# Patient Record
Sex: MALE | Race: WHITE | NOT HISPANIC OR LATINO | URBAN - METROPOLITAN AREA
[De-identification: names, ages, dates, MRNs, and addresses within clinical notes are randomized per-mention and may not be internally consistent; named-entity substitution may affect disease eponyms.]

---

## 2017-05-10 ENCOUNTER — OUTPATIENT (OUTPATIENT)
Dept: OUTPATIENT SERVICES | Facility: HOSPITAL | Age: 67
LOS: 1 days | End: 2017-05-10
Payer: MEDICARE

## 2017-05-10 DIAGNOSIS — M47.817 SPONDYLOSIS WITHOUT MYELOPATHY OR RADICULOPATHY, LUMBOSACRAL REGION: ICD-10-CM

## 2017-05-10 PROCEDURE — 76000 FLUOROSCOPY <1 HR PHYS/QHP: CPT

## 2017-05-10 PROCEDURE — 64495 INJ PARAVERT F JNT L/S 3 LEV: CPT | Mod: 50

## 2017-05-10 PROCEDURE — 64494 INJ PARAVERT F JNT L/S 2 LEV: CPT | Mod: 50

## 2017-05-10 PROCEDURE — 64493 INJ PARAVERT F JNT L/S 1 LEV: CPT | Mod: 50

## 2017-05-11 ENCOUNTER — TRANSCRIPTION ENCOUNTER (OUTPATIENT)
Age: 67
End: 2017-05-11

## 2017-05-17 ENCOUNTER — OUTPATIENT (OUTPATIENT)
Dept: OUTPATIENT SERVICES | Facility: HOSPITAL | Age: 67
LOS: 1 days | End: 2017-05-17
Payer: MEDICARE

## 2017-05-17 DIAGNOSIS — M47.812 SPONDYLOSIS WITHOUT MYELOPATHY OR RADICULOPATHY, CERVICAL REGION: ICD-10-CM

## 2017-05-17 PROCEDURE — 64492 INJ PARAVERT F JNT C/T 3 LEV: CPT | Mod: 50

## 2017-05-17 PROCEDURE — 76000 FLUOROSCOPY <1 HR PHYS/QHP: CPT

## 2017-05-17 PROCEDURE — 64490 INJ PARAVERT F JNT C/T 1 LEV: CPT | Mod: 50

## 2017-05-17 PROCEDURE — 64491 INJ PARAVERT F JNT C/T 2 LEV: CPT | Mod: 50

## 2017-05-18 ENCOUNTER — TRANSCRIPTION ENCOUNTER (OUTPATIENT)
Age: 67
End: 2017-05-18

## 2019-06-12 ENCOUNTER — EMERGENCY (EMERGENCY)
Facility: HOSPITAL | Age: 69
LOS: 1 days | Discharge: OTHER INSTITUTION | End: 2019-06-12
Attending: EMERGENCY MEDICINE
Payer: MEDICARE

## 2019-06-12 VITALS
OXYGEN SATURATION: 93 % | HEART RATE: 83 BPM | RESPIRATION RATE: 20 BRPM | WEIGHT: 259.93 LBS | HEIGHT: 68 IN | TEMPERATURE: 97 F | DIASTOLIC BLOOD PRESSURE: 81 MMHG | SYSTOLIC BLOOD PRESSURE: 145 MMHG

## 2019-06-12 DIAGNOSIS — F31.4 BIPOLAR DISORDER, CURRENT EPISODE DEPRESSED, SEVERE, WITHOUT PSYCHOTIC FEATURES: ICD-10-CM

## 2019-06-12 LAB
ALBUMIN SERPL ELPH-MCNC: 4.4 G/DL — SIGNIFICANT CHANGE UP (ref 3.3–5.2)
ALP SERPL-CCNC: 110 U/L — SIGNIFICANT CHANGE UP (ref 40–120)
ALT FLD-CCNC: 35 U/L — SIGNIFICANT CHANGE UP
AMPHET UR-MCNC: NEGATIVE — SIGNIFICANT CHANGE UP
ANION GAP SERPL CALC-SCNC: 13 MMOL/L — SIGNIFICANT CHANGE UP (ref 5–17)
APAP SERPL-MCNC: <7.5 UG/ML — LOW (ref 10–26)
APPEARANCE UR: CLEAR — SIGNIFICANT CHANGE UP
AST SERPL-CCNC: 24 U/L — SIGNIFICANT CHANGE UP
BARBITURATES UR SCN-MCNC: NEGATIVE — SIGNIFICANT CHANGE UP
BASOPHILS # BLD AUTO: 0 K/UL — SIGNIFICANT CHANGE UP (ref 0–0.2)
BASOPHILS NFR BLD AUTO: 0.1 % — SIGNIFICANT CHANGE UP (ref 0–2)
BENZODIAZ UR-MCNC: POSITIVE
BILIRUB SERPL-MCNC: 0.6 MG/DL — SIGNIFICANT CHANGE UP (ref 0.4–2)
BILIRUB UR-MCNC: NEGATIVE — SIGNIFICANT CHANGE UP
BUN SERPL-MCNC: 16 MG/DL — SIGNIFICANT CHANGE UP (ref 8–20)
CALCIUM SERPL-MCNC: 9.8 MG/DL — SIGNIFICANT CHANGE UP (ref 8.6–10.2)
CHLORIDE SERPL-SCNC: 101 MMOL/L — SIGNIFICANT CHANGE UP (ref 98–107)
CO2 SERPL-SCNC: 25 MMOL/L — SIGNIFICANT CHANGE UP (ref 22–29)
COCAINE METAB.OTHER UR-MCNC: NEGATIVE — SIGNIFICANT CHANGE UP
COLOR SPEC: YELLOW — SIGNIFICANT CHANGE UP
CREAT SERPL-MCNC: 0.92 MG/DL — SIGNIFICANT CHANGE UP (ref 0.5–1.3)
DIFF PNL FLD: NEGATIVE — SIGNIFICANT CHANGE UP
EOSINOPHIL # BLD AUTO: 0.1 K/UL — SIGNIFICANT CHANGE UP (ref 0–0.5)
EOSINOPHIL NFR BLD AUTO: 1.5 % — SIGNIFICANT CHANGE UP (ref 0–5)
EPI CELLS # UR: SIGNIFICANT CHANGE UP
ETHANOL SERPL-MCNC: <10 MG/DL — SIGNIFICANT CHANGE UP
GLUCOSE SERPL-MCNC: 238 MG/DL — HIGH (ref 70–115)
GLUCOSE UR QL: 100 MG/DL
HCT VFR BLD CALC: 44.6 % — SIGNIFICANT CHANGE UP (ref 42–52)
HGB BLD-MCNC: 15.4 G/DL — SIGNIFICANT CHANGE UP (ref 14–18)
KETONES UR-MCNC: NEGATIVE — SIGNIFICANT CHANGE UP
LEUKOCYTE ESTERASE UR-ACNC: NEGATIVE — SIGNIFICANT CHANGE UP
LYMPHOCYTES # BLD AUTO: 1.9 K/UL — SIGNIFICANT CHANGE UP (ref 1–4.8)
LYMPHOCYTES # BLD AUTO: 24.1 % — SIGNIFICANT CHANGE UP (ref 20–55)
MAGNESIUM SERPL-MCNC: 1.9 MG/DL — SIGNIFICANT CHANGE UP (ref 1.8–2.6)
MCHC RBC-ENTMCNC: 31 PG — SIGNIFICANT CHANGE UP (ref 27–31)
MCHC RBC-ENTMCNC: 34.5 G/DL — SIGNIFICANT CHANGE UP (ref 32–36)
MCV RBC AUTO: 89.9 FL — SIGNIFICANT CHANGE UP (ref 80–94)
METHADONE UR-MCNC: NEGATIVE — SIGNIFICANT CHANGE UP
MONOCYTES # BLD AUTO: 0.5 K/UL — SIGNIFICANT CHANGE UP (ref 0–0.8)
MONOCYTES NFR BLD AUTO: 6.8 % — SIGNIFICANT CHANGE UP (ref 3–10)
NEUTROPHILS # BLD AUTO: 5.4 K/UL — SIGNIFICANT CHANGE UP (ref 1.8–8)
NEUTROPHILS NFR BLD AUTO: 67.2 % — SIGNIFICANT CHANGE UP (ref 37–73)
NITRITE UR-MCNC: NEGATIVE — SIGNIFICANT CHANGE UP
OPIATES UR-MCNC: NEGATIVE — SIGNIFICANT CHANGE UP
PCP SPEC-MCNC: SIGNIFICANT CHANGE UP
PCP UR-MCNC: NEGATIVE — SIGNIFICANT CHANGE UP
PH UR: 6 — SIGNIFICANT CHANGE UP (ref 5–8)
PLATELET # BLD AUTO: 154 K/UL — SIGNIFICANT CHANGE UP (ref 150–400)
POTASSIUM SERPL-MCNC: 4.4 MMOL/L — SIGNIFICANT CHANGE UP (ref 3.5–5.3)
POTASSIUM SERPL-SCNC: 4.4 MMOL/L — SIGNIFICANT CHANGE UP (ref 3.5–5.3)
PROT SERPL-MCNC: 8.2 G/DL — SIGNIFICANT CHANGE UP (ref 6.6–8.7)
PROT UR-MCNC: 15 MG/DL
RBC # BLD: 4.96 M/UL — SIGNIFICANT CHANGE UP (ref 4.6–6.2)
RBC # FLD: 13.1 % — SIGNIFICANT CHANGE UP (ref 11–15.6)
RBC CASTS # UR COMP ASSIST: NEGATIVE /HPF — SIGNIFICANT CHANGE UP (ref 0–4)
SALICYLATES SERPL-MCNC: <0.6 MG/DL — LOW (ref 10–20)
SODIUM SERPL-SCNC: 139 MMOL/L — SIGNIFICANT CHANGE UP (ref 135–145)
SP GR SPEC: 1.01 — SIGNIFICANT CHANGE UP (ref 1.01–1.02)
THC UR QL: NEGATIVE — SIGNIFICANT CHANGE UP
TSH SERPL-MCNC: 1 UIU/ML — SIGNIFICANT CHANGE UP (ref 0.27–4.2)
UROBILINOGEN FLD QL: NEGATIVE MG/DL — SIGNIFICANT CHANGE UP
WBC # BLD: 8 K/UL — SIGNIFICANT CHANGE UP (ref 4.8–10.8)
WBC # FLD AUTO: 8 K/UL — SIGNIFICANT CHANGE UP (ref 4.8–10.8)
WBC UR QL: SIGNIFICANT CHANGE UP

## 2019-06-12 PROCEDURE — 93010 ELECTROCARDIOGRAM REPORT: CPT

## 2019-06-12 PROCEDURE — 99285 EMERGENCY DEPT VISIT HI MDM: CPT

## 2019-06-12 PROCEDURE — 90792 PSYCH DIAG EVAL W/MED SRVCS: CPT

## 2019-06-12 RX ORDER — HYDROXYZINE HCL 10 MG
50 TABLET ORAL THREE TIMES A DAY
Refills: 0 | Status: DISCONTINUED | OUTPATIENT
Start: 2019-06-12 | End: 2019-06-17

## 2019-06-12 RX ORDER — LURASIDONE HYDROCHLORIDE 40 MG/1
60 TABLET ORAL AT BEDTIME
Refills: 0 | Status: DISCONTINUED | OUTPATIENT
Start: 2019-06-12 | End: 2019-06-17

## 2019-06-12 RX ORDER — ATORVASTATIN CALCIUM 80 MG/1
20 TABLET, FILM COATED ORAL DAILY
Refills: 0 | Status: DISCONTINUED | OUTPATIENT
Start: 2019-06-12 | End: 2019-06-17

## 2019-06-12 RX ORDER — ALPRAZOLAM 0.25 MG
1 TABLET ORAL THREE TIMES A DAY
Refills: 0 | Status: COMPLETED | OUTPATIENT
Start: 2019-06-12 | End: 2019-06-19

## 2019-06-12 RX ORDER — LEVOTHYROXINE SODIUM 125 MCG
112 TABLET ORAL DAILY
Refills: 0 | Status: DISCONTINUED | OUTPATIENT
Start: 2019-06-12 | End: 2019-06-17

## 2019-06-12 RX ORDER — TRAZODONE HCL 50 MG
50 TABLET ORAL AT BEDTIME
Refills: 0 | Status: DISCONTINUED | OUTPATIENT
Start: 2019-06-12 | End: 2019-06-17

## 2019-06-12 RX ADMIN — ATORVASTATIN CALCIUM 20 MILLIGRAM(S): 80 TABLET, FILM COATED ORAL at 22:56

## 2019-06-12 RX ADMIN — Medication 2 MILLIGRAM(S): at 15:27

## 2019-06-12 RX ADMIN — LURASIDONE HYDROCHLORIDE 60 MILLIGRAM(S): 40 TABLET ORAL at 22:57

## 2019-06-12 RX ADMIN — Medication 50 MILLIGRAM(S): at 22:56

## 2019-06-12 RX ADMIN — Medication 50 MILLIGRAM(S): at 22:57

## 2019-06-12 RX ADMIN — Medication 1 MILLIGRAM(S): at 22:57

## 2019-06-12 NOTE — ED BEHAVIORAL HEALTH NOTE - BEHAVIORAL HEALTH NOTE
SW Note: Pt was seen by Dr. Wray and plan is to transfer pt for inpt psychiatric tx. The pt will be on a voluntary status and only wants placement at [facility]. Called [facility] [phone]. No appropriate male beds at this time.  provider and staff aware. pt will be held overnight to explore placement at [facility] tomorrow

## 2019-06-12 NOTE — ED STATDOCS - CLINICAL SUMMARY MEDICAL DECISION MAKING FREE TEXT BOX
Psych evaluation for mood fluctuance and SI. Psych evaluation for mood fluctuance and SI. will check labs, ekg

## 2019-06-12 NOTE — ED BEHAVIORAL HEALTH ASSESSMENT NOTE - RISK ASSESSMENT
High: Patient reports high psychic anxiety, global insomnia, failed outpatient tx, hopeless, recent suicidal ideation, with inability to contract for safety, he does want help, has good support, and is engaged in treatment.  He denies any substance use.

## 2019-06-12 NOTE — ED BEHAVIORAL HEALTH ASSESSMENT NOTE - DESCRIPTION
Patient's leg was shaking, he reports feeling "jittery".  States he feels less anxious at the moment. Denies any physical complaints.      Vital Signs Last 24 Hrs  T(C): 36.6 (12 Jun 2019 12:43), Max: 36.6 (12 Jun 2019 12:43)  T(F): 97.8 (12 Jun 2019 12:43), Max: 97.8 (12 Jun 2019 12:43)  HR: 89 (12 Jun 2019 12:43) (83 - 89)  BP: 145/79 (12 Jun 2019 12:43) (145/79 - 145/81)  BP(mean): --  RR: 18 (12 Jun 2019 12:43) (18 - 20)  SpO2: 96% (12 Jun 2019 12:43) (93% - 96%) DM, HLD, hypothyroidism Patient was raised by parents.  He was one of 4 children.  Father was shot and killed when he was 55.  Currently  with one duaghter

## 2019-06-12 NOTE — ED ADULT NURSE NOTE - OBJECTIVE STATEMENT
Patient presented in  dressed in casual attire.  Patient alert and oriented times four.  Patient reports he has had worsening anxiety and not slept in 4 days.  Patient reports Dr. Live his psychiatrist has been trying to manage his BAD on an outpatient basis but he feels he needs to be hospitalized.  Patient describes being manic over to winter holidays and then depressed and now extremely anxious.  Denies any psychotic symptoms.  Patient denies suicidal or homicidal ideations.  Patients last hospitalization was 20 years ago at [facility].

## 2019-06-12 NOTE — ED BEHAVIORAL HEALTH ASSESSMENT NOTE - CURRENT MEDICATION
Xanax 1 mg 3 x daily, Latuda 80 mg at night, Hydroxyzine 50 mg 3 x daily,  [place] thyroid 90 mg daily, Atorvastatin 20 mg daily, Novolog Flexpen, Lantus Solostar

## 2019-06-12 NOTE — ED BEHAVIORAL HEALTH ASSESSMENT NOTE - SUMMARY
Patient is a 69  year old, male; domiciled with wife of many years ; with one adult daughter,  retired , with long h/o Bipolar disorder; with multiple prior psychiatric  hospitalizations (last one was over 20 years ago) ; no known suicide attempts; no known history of violence or arrests; no active substance abuse or known history of complicated withdrawal; with PMhx of DM, HLD who walked in to ER accompanied by wife referred by psychiatrist  for evaluation of intolerable and anxiety, suicidal ideation and requesting inpatient psychiatric hospitalization.  Patient reports that he was stable for the last 20 years and had first manic episode in over 20 years over holidays.  He psychiatrist adjusted medications (added Latuda, discontinued Lamictal) and patient reports that around march his mood switched from "high" to "low".  Over the last month he has been increasingly anxious.  He denies any specific stressors, and over the last few days it has been intolerable and lasting for hours at time with global insomnia, anhedonia, decreased energy and concentration. Last night patient developed suicidal ideation for first time in his life and was afraid that he would act on these thoughts. He reports medications are no longer working and he is unable to function at home.  He saw psychiatrist on 6/10 who referred him for inpatient hospitalization. Patient is considered high risk and is requesting voluntary inpatient hospitalization

## 2019-06-12 NOTE — ED BEHAVIORAL HEALTH ASSESSMENT NOTE - HPI (INCLUDE ILLNESS QUALITY, SEVERITY, DURATION, TIMING, CONTEXT, MODIFYING FACTORS, ASSOCIATED SIGNS AND SYMPTOMS)
Patient is a 69  year old, male; domiciled with wife of many years ; with one adult daughter,  retired , with long h/o Bipolar disorder; with multiple prior psychiatric  hospitalizations (last one was over 20 years ago) ; no known suicide attempts; no known history of violence or arrests; no active substance abuse or known history of complicated withdrawal; with PMhx of DM, HLD who walked in to ER accompanied by wife referred by psychiatrist  for evaluation of intolerable and anxiety, suicidal ideation and requesting inpatient psychiatric hospitalization.       Patient reports that he has been stable for over 20 years until the holidays when he had a manic episodes during which he reports several weeks of increased energy, decreased need for sleep, more impulsive behavior, increase in cooking, following supplements and organic diets.  He had been taking medication consistently. He followed up with his psychiatrist Dr. Live who adjusted the medications and he fell into a "low episode" starting march.        Patient reports that for the last month he has been increasingly anxious and medication adjustments by psychiatrist do not work.  He states he was started on Hydroxyzine, latuda was increased and discontinued and even taking extra Xanax have not been effective.  For the last few days his anxiety has been getting worse. He reports he hardly slept for the last three days, and last night was the worse in hi life.  He reports his anxiety has been "extreme".  He denies any stressors and denies any cognitive thoughts associated with anxiety.  Last night he had suicidal thoughts for the first time.  He told his wife he "can't take it any longer".  Although he denied having any intent and plan.  He reports he wants help but was afraid that he would do something to end his life. He reiterated that this has never happened before.    Patient reports that he does not feel safe outside the hospital and is afraid of continued anxiety. He did see psychiatrist on 6/10 who recommended admission to the hospital for further help.        As per progress note from psychiatrist on 6/10 patient was described as "depression with anxiety tension, fearful of future, insomnia, cannot relax, feels hopeless, cannot function at home.  He has been using high doses of benzodiazepines without any benefit" and he referred patient for "inpatient hospitalization".    Concerning other psychiatric symptoms reports depressed mood, but feels that anxiety is primary, he reports difficulty the last month, with low energy the last week, inability to derive pleasure from activities. Pt denies any current symptoms of  happiness, unusual energy, unusual nightime excitation or other common symptoms of ollie. Pt denies hearing voices or seeing things.  No delusions were elicited.  Patient does report prior panic attacks.  He feels he needs to be in the hospital and wants to voluntarily sign in.

## 2019-06-12 NOTE — ED BEHAVIORAL HEALTH ASSESSMENT NOTE - OTHER PAST PSYCHIATRIC HISTORY (INCLUDE DETAILS REGARDING ONSET, COURSE OF ILLNESS, INPATIENT/OUTPATIENT TREATMENT)
Patient has long psychiatric history. Patient was first admitted to hospital in 1967 while in hospital for first severe depressive episode.  Patient reports he was treated with ECT.  He reports he has been hospitalized a total of around 5 times and last time was 1988.  Most of other episodes were related to ollie. He denies any h/o prior suicide attempts. Currently he is followed by Dr. Live.

## 2019-06-12 NOTE — ED BEHAVIORAL HEALTH ASSESSMENT NOTE - DETAILS
NA see HPI appreciated most recent progress note looking for inpatient beds appreciated most recent progress note. Called psychiatrist and left message with office ([phone])

## 2019-06-12 NOTE — ED BEHAVIORAL HEALTH ASSESSMENT NOTE - OTHER
most recent progress note from psychiatrist (6/10) billing in PK psychiatrist looking for inpatient beds

## 2019-06-12 NOTE — ED ADULT NURSE NOTE - CHIEF COMPLAINT QUOTE
bib wife, ryan since april 80 mg,  hydroxyzine 50 tid, pt states bipolar, c/o pain and headache, and SI if "pain won't go away " behavioural change, pt appears anxious and very restless

## 2019-06-12 NOTE — ED STATDOCS - OBJECTIVE STATEMENT
69 y/o M pt with PMHx of bipolar, DM, HLD, presents to the ED c/o psychiatric evaluation. Patient states that he has not had a manic episode in 20 years. Around [place] time, his episodes began again, but had worsened. Last night states he could not rid of the pain in his head after taking his xanax. States he has a brief time last night where he had SI. Believes his xanax is not working. He has not been able to sleep properly due to the anxiety. Denies CP, SOB, HI.

## 2019-06-13 VITALS
SYSTOLIC BLOOD PRESSURE: 136 MMHG | TEMPERATURE: 98 F | HEART RATE: 88 BPM | DIASTOLIC BLOOD PRESSURE: 82 MMHG | RESPIRATION RATE: 18 BRPM | OXYGEN SATURATION: 95 %

## 2019-06-13 PROCEDURE — 36415 COLL VENOUS BLD VENIPUNCTURE: CPT

## 2019-06-13 PROCEDURE — 99285 EMERGENCY DEPT VISIT HI MDM: CPT | Mod: 25

## 2019-06-13 PROCEDURE — 81001 URINALYSIS AUTO W/SCOPE: CPT

## 2019-06-13 PROCEDURE — 84443 ASSAY THYROID STIM HORMONE: CPT

## 2019-06-13 PROCEDURE — 85027 COMPLETE CBC AUTOMATED: CPT

## 2019-06-13 PROCEDURE — 80053 COMPREHEN METABOLIC PANEL: CPT

## 2019-06-13 PROCEDURE — 93005 ELECTROCARDIOGRAM TRACING: CPT

## 2019-06-13 PROCEDURE — 96372 THER/PROPH/DIAG INJ SC/IM: CPT

## 2019-06-13 PROCEDURE — 83735 ASSAY OF MAGNESIUM: CPT

## 2019-06-13 PROCEDURE — 82962 GLUCOSE BLOOD TEST: CPT

## 2019-06-13 PROCEDURE — 80307 DRUG TEST PRSMV CHEM ANLYZR: CPT

## 2019-06-13 RX ADMIN — Medication 112 MICROGRAM(S): at 06:58

## 2019-06-13 RX ADMIN — Medication 50 MILLIGRAM(S): at 06:58

## 2019-06-13 RX ADMIN — Medication 1 MILLIGRAM(S): at 06:58

## 2019-06-13 RX ADMIN — Medication 2 MILLIGRAM(S): at 01:08

## 2019-06-13 NOTE — ED BEHAVIORAL HEALTH NOTE - BEHAVIORAL HEALTH NOTE
SW Note: pt accepted to [facility] and transferred today for inpt psychiatric care. Accepting Dr. Adal Yee. 9.13 legals completed with pt. [facility]. Pts primary policy is medicare but has 2 other policies 1. B/C ID [id number] and 2. B/C ID [id number]. Confirmed with [facility] RAYMOND Gracia that his second policy only needs a precert. SW to follow

## 2019-06-13 NOTE — ED ADULT NURSE REASSESSMENT NOTE - NS ED NURSE REASSESS COMMENT FT1
Patient ate 100% of his breakfast and then returned to bed.  Safety of patient maintained.
Patient requested and received medication intervention for increasing anxiety.  Patient received Ativan 2mg IM in right gluteal muscle at 1527 with results pending.  Safety of patient maintained.
Assumed care of pt @1930. Pt c/o of not being able to sleep for the past x3 nights. Pt anxious at times, requesting medication for anxiety and insomnia. Pt currently resting/sleeping comfortably, in no apparent distress. No harm to self or others. Safety maintained. Will continue to monitor the pt for safety.
Assumed care of patient at 0720.  Patient resting in bed awake with no distress noted.  Patient oriented to staff and educated about plan of care which he verbalized understanding of.  Safety of patient maintained.
Patient adjusting well to  area.  Patient sitting at table eating lunch.  No attempts to harm self or others and safety maintained.

## 2019-06-13 NOTE — ED ADULT NURSE REASSESSMENT NOTE - COMFORT CARE
warm blanket provided/wait time explained/ambulated to bathroom/darkened lights/repositioned/plan of care explained
plan of care explained
plan of care explained

## 2019-06-13 NOTE — ED ADULT NURSE REASSESSMENT NOTE - GENERAL PATIENT STATE
resting/sleeping/comfortable appearance/anxious/cooperative
cooperative/comfortable appearance
resting/sleeping/comfortable appearance/cooperative

## 2019-07-26 ENCOUNTER — INPATIENT (INPATIENT)
Facility: HOSPITAL | Age: 69
LOS: 34 days | Discharge: HOME | End: 2019-08-30
Attending: PSYCHIATRY & NEUROLOGY | Admitting: PSYCHIATRY & NEUROLOGY
Payer: MEDICARE

## 2019-07-26 VITALS
HEART RATE: 70 BPM | SYSTOLIC BLOOD PRESSURE: 125 MMHG | TEMPERATURE: 98 F | RESPIRATION RATE: 16 BRPM | OXYGEN SATURATION: 100 % | DIASTOLIC BLOOD PRESSURE: 72 MMHG

## 2019-07-26 DIAGNOSIS — F31.30 BIPOLAR DISORDER, CURRENT EPISODE DEPRESSED, MILD OR MODERATE SEVERITY, UNSPECIFIED: ICD-10-CM

## 2019-07-26 LAB
ALBUMIN SERPL ELPH-MCNC: 4.2 G/DL — SIGNIFICANT CHANGE UP (ref 3.3–5)
ALP SERPL-CCNC: 112 U/L — SIGNIFICANT CHANGE UP (ref 40–120)
ALT FLD-CCNC: 47 U/L — HIGH (ref 4–41)
AMPHET UR-MCNC: NEGATIVE — SIGNIFICANT CHANGE UP
ANION GAP SERPL CALC-SCNC: 10 MMO/L — SIGNIFICANT CHANGE UP (ref 7–14)
APAP SERPL-MCNC: < 15 UG/ML — LOW (ref 15–25)
APPEARANCE UR: CLEAR — SIGNIFICANT CHANGE UP
AST SERPL-CCNC: 20 U/L — SIGNIFICANT CHANGE UP (ref 4–40)
BARBITURATES UR SCN-MCNC: NEGATIVE — SIGNIFICANT CHANGE UP
BASOPHILS # BLD AUTO: 0.04 K/UL — SIGNIFICANT CHANGE UP (ref 0–0.2)
BASOPHILS NFR BLD AUTO: 0.4 % — SIGNIFICANT CHANGE UP (ref 0–2)
BENZODIAZ UR-MCNC: NEGATIVE — SIGNIFICANT CHANGE UP
BILIRUB SERPL-MCNC: 0.5 MG/DL — SIGNIFICANT CHANGE UP (ref 0.2–1.2)
BILIRUB UR-MCNC: NEGATIVE — SIGNIFICANT CHANGE UP
BLOOD UR QL VISUAL: NEGATIVE — SIGNIFICANT CHANGE UP
BUN SERPL-MCNC: 16 MG/DL — SIGNIFICANT CHANGE UP (ref 7–23)
CALCIUM SERPL-MCNC: 9.5 MG/DL — SIGNIFICANT CHANGE UP (ref 8.4–10.5)
CANNABINOIDS UR-MCNC: NEGATIVE — SIGNIFICANT CHANGE UP
CHLORIDE SERPL-SCNC: 106 MMOL/L — SIGNIFICANT CHANGE UP (ref 98–107)
CO2 SERPL-SCNC: 23 MMOL/L — SIGNIFICANT CHANGE UP (ref 22–31)
COCAINE METAB.OTHER UR-MCNC: NEGATIVE — SIGNIFICANT CHANGE UP
COLOR SPEC: SIGNIFICANT CHANGE UP
CREAT SERPL-MCNC: 0.88 MG/DL — SIGNIFICANT CHANGE UP (ref 0.5–1.3)
EOSINOPHIL # BLD AUTO: 0.23 K/UL — SIGNIFICANT CHANGE UP (ref 0–0.5)
EOSINOPHIL NFR BLD AUTO: 2.1 % — SIGNIFICANT CHANGE UP (ref 0–6)
ETHANOL BLD-MCNC: < 10 MG/DL — SIGNIFICANT CHANGE UP
GLUCOSE SERPL-MCNC: 216 MG/DL — HIGH (ref 70–99)
GLUCOSE UR-MCNC: 500 — HIGH
HCT VFR BLD CALC: 45.1 % — SIGNIFICANT CHANGE UP (ref 39–50)
HGB BLD-MCNC: 14.9 G/DL — SIGNIFICANT CHANGE UP (ref 13–17)
IMM GRANULOCYTES NFR BLD AUTO: 0.5 % — SIGNIFICANT CHANGE UP (ref 0–1.5)
KETONES UR-MCNC: NEGATIVE — SIGNIFICANT CHANGE UP
LEUKOCYTE ESTERASE UR-ACNC: NEGATIVE — SIGNIFICANT CHANGE UP
LITHIUM SERPL-MCNC: 0.86 MMOL/L — SIGNIFICANT CHANGE UP (ref 0.6–1.2)
LYMPHOCYTES # BLD AUTO: 2.49 K/UL — SIGNIFICANT CHANGE UP (ref 1–3.3)
LYMPHOCYTES # BLD AUTO: 23.2 % — SIGNIFICANT CHANGE UP (ref 13–44)
MCHC RBC-ENTMCNC: 30.2 PG — SIGNIFICANT CHANGE UP (ref 27–34)
MCHC RBC-ENTMCNC: 33 % — SIGNIFICANT CHANGE UP (ref 32–36)
MCV RBC AUTO: 91.3 FL — SIGNIFICANT CHANGE UP (ref 80–100)
METHADONE UR-MCNC: NEGATIVE — SIGNIFICANT CHANGE UP
MONOCYTES # BLD AUTO: 0.56 K/UL — SIGNIFICANT CHANGE UP (ref 0–0.9)
MONOCYTES NFR BLD AUTO: 5.2 % — SIGNIFICANT CHANGE UP (ref 2–14)
NEUTROPHILS # BLD AUTO: 7.34 K/UL — SIGNIFICANT CHANGE UP (ref 1.8–7.4)
NEUTROPHILS NFR BLD AUTO: 68.6 % — SIGNIFICANT CHANGE UP (ref 43–77)
NITRITE UR-MCNC: NEGATIVE — SIGNIFICANT CHANGE UP
NRBC # FLD: 0 K/UL — SIGNIFICANT CHANGE UP (ref 0–0)
OPIATES UR-MCNC: NEGATIVE — SIGNIFICANT CHANGE UP
OXYCODONE UR-MCNC: NEGATIVE — SIGNIFICANT CHANGE UP
PCP UR-MCNC: NEGATIVE — SIGNIFICANT CHANGE UP
PH UR: 6.5 — SIGNIFICANT CHANGE UP (ref 5–8)
PLATELET # BLD AUTO: 165 K/UL — SIGNIFICANT CHANGE UP (ref 150–400)
PMV BLD: 10.2 FL — SIGNIFICANT CHANGE UP (ref 7–13)
POTASSIUM SERPL-MCNC: 4.5 MMOL/L — SIGNIFICANT CHANGE UP (ref 3.5–5.3)
POTASSIUM SERPL-SCNC: 4.5 MMOL/L — SIGNIFICANT CHANGE UP (ref 3.5–5.3)
PROT SERPL-MCNC: 8 G/DL — SIGNIFICANT CHANGE UP (ref 6–8.3)
PROT UR-MCNC: 20 — SIGNIFICANT CHANGE UP
RBC # BLD: 4.94 M/UL — SIGNIFICANT CHANGE UP (ref 4.2–5.8)
RBC # FLD: 12.2 % — SIGNIFICANT CHANGE UP (ref 10.3–14.5)
SALICYLATES SERPL-MCNC: < 5 MG/DL — LOW (ref 15–30)
SODIUM SERPL-SCNC: 139 MMOL/L — SIGNIFICANT CHANGE UP (ref 135–145)
SP GR SPEC: 1.01 — SIGNIFICANT CHANGE UP (ref 1–1.04)
TSH SERPL-MCNC: 1.97 UIU/ML — SIGNIFICANT CHANGE UP (ref 0.27–4.2)
UROBILINOGEN FLD QL: NORMAL — SIGNIFICANT CHANGE UP
WBC # BLD: 10.71 K/UL — HIGH (ref 3.8–10.5)
WBC # FLD AUTO: 10.71 K/UL — HIGH (ref 3.8–10.5)
WBC UR QL: SIGNIFICANT CHANGE UP (ref 0–?)

## 2019-07-26 PROCEDURE — 99285 EMERGENCY DEPT VISIT HI MDM: CPT | Mod: GC

## 2019-07-26 RX ORDER — LEVOTHYROXINE SODIUM 125 MCG
150 TABLET ORAL DAILY
Refills: 0 | Status: DISCONTINUED | OUTPATIENT
Start: 2019-07-26 | End: 2019-08-30

## 2019-07-26 RX ORDER — SODIUM CHLORIDE 9 MG/ML
1000 INJECTION, SOLUTION INTRAVENOUS
Refills: 0 | Status: DISCONTINUED | OUTPATIENT
Start: 2019-07-26 | End: 2019-07-29

## 2019-07-26 RX ORDER — DEXTROSE 50 % IN WATER 50 %
15 SYRINGE (ML) INTRAVENOUS ONCE
Refills: 0 | Status: DISCONTINUED | OUTPATIENT
Start: 2019-07-26 | End: 2019-08-30

## 2019-07-26 RX ORDER — LANOLIN ALCOHOL/MO/W.PET/CERES
5 CREAM (GRAM) TOPICAL AT BEDTIME
Refills: 0 | Status: DISCONTINUED | OUTPATIENT
Start: 2019-07-26 | End: 2019-08-30

## 2019-07-26 RX ORDER — LITHIUM CARBONATE 300 MG/1
600 TABLET, EXTENDED RELEASE ORAL DAILY
Refills: 0 | Status: DISCONTINUED | OUTPATIENT
Start: 2019-07-26 | End: 2019-07-28

## 2019-07-26 RX ORDER — DEXTROSE 50 % IN WATER 50 %
25 SYRINGE (ML) INTRAVENOUS ONCE
Refills: 0 | Status: DISCONTINUED | OUTPATIENT
Start: 2019-07-26 | End: 2019-07-29

## 2019-07-26 RX ORDER — DEXTROSE 50 % IN WATER 50 %
12.5 SYRINGE (ML) INTRAVENOUS ONCE
Refills: 0 | Status: DISCONTINUED | OUTPATIENT
Start: 2019-07-26 | End: 2019-07-29

## 2019-07-26 RX ORDER — INSULIN GLARGINE 100 [IU]/ML
10 INJECTION, SOLUTION SUBCUTANEOUS EVERY MORNING
Refills: 0 | Status: DISCONTINUED | OUTPATIENT
Start: 2019-07-27 | End: 2019-08-30

## 2019-07-26 RX ORDER — SENNA PLUS 8.6 MG/1
1 TABLET ORAL
Refills: 0 | Status: DISCONTINUED | OUTPATIENT
Start: 2019-07-26 | End: 2019-08-30

## 2019-07-26 RX ORDER — SERTRALINE 25 MG/1
50 TABLET, FILM COATED ORAL DAILY
Refills: 0 | Status: DISCONTINUED | OUTPATIENT
Start: 2019-07-26 | End: 2019-07-28

## 2019-07-26 RX ORDER — GABAPENTIN 400 MG/1
600 CAPSULE ORAL THREE TIMES A DAY
Refills: 0 | Status: DISCONTINUED | OUTPATIENT
Start: 2019-07-26 | End: 2019-07-28

## 2019-07-26 RX ORDER — ATORVASTATIN CALCIUM 80 MG/1
20 TABLET, FILM COATED ORAL AT BEDTIME
Refills: 0 | Status: DISCONTINUED | OUTPATIENT
Start: 2019-07-26 | End: 2019-08-30

## 2019-07-26 RX ORDER — INSULIN LISPRO 100/ML
VIAL (ML) SUBCUTANEOUS
Refills: 0 | Status: DISCONTINUED | OUTPATIENT
Start: 2019-07-26 | End: 2019-08-30

## 2019-07-26 RX ORDER — GLUCAGON INJECTION, SOLUTION 0.5 MG/.1ML
1 INJECTION, SOLUTION SUBCUTANEOUS ONCE
Refills: 0 | Status: DISCONTINUED | OUTPATIENT
Start: 2019-07-26 | End: 2019-08-30

## 2019-07-26 RX ORDER — INSULIN GLARGINE 100 [IU]/ML
50 INJECTION, SOLUTION SUBCUTANEOUS AT BEDTIME
Refills: 0 | Status: DISCONTINUED | OUTPATIENT
Start: 2019-07-26 | End: 2019-08-30

## 2019-07-26 RX ORDER — LITHIUM CARBONATE 300 MG/1
300 TABLET, EXTENDED RELEASE ORAL AT BEDTIME
Refills: 0 | Status: DISCONTINUED | OUTPATIENT
Start: 2019-07-26 | End: 2019-07-28

## 2019-07-26 RX ADMIN — Medication 2: at 17:34

## 2019-07-26 RX ADMIN — Medication 5 MILLIGRAM(S): at 20:29

## 2019-07-26 RX ADMIN — Medication 1 MILLIGRAM(S): at 16:43

## 2019-07-26 RX ADMIN — INSULIN GLARGINE 50 UNIT(S): 100 INJECTION, SOLUTION SUBCUTANEOUS at 20:57

## 2019-07-26 RX ADMIN — ATORVASTATIN CALCIUM 20 MILLIGRAM(S): 80 TABLET, FILM COATED ORAL at 20:28

## 2019-07-26 RX ADMIN — GABAPENTIN 600 MILLIGRAM(S): 400 CAPSULE ORAL at 20:28

## 2019-07-26 RX ADMIN — LITHIUM CARBONATE 300 MILLIGRAM(S): 300 TABLET, EXTENDED RELEASE ORAL at 20:28

## 2019-07-26 RX ADMIN — SENNA PLUS 1 TABLET(S): 8.6 TABLET ORAL at 20:29

## 2019-07-26 NOTE — ED BEHAVIORAL HEALTH ASSESSMENT NOTE - PSYCHIATRIC ISSUES AND PLAN (INCLUDE STANDING AND PRN MEDICATION)
Will continue current medications. Primary team to evaluate and make recommendations, including possibility of ECT. Will continue current medications. Primary team to evaluate and make recommendations, including possibility of ECT. PRN anxiety/agitation: Ativan 1mg PO q6h. PRN severe agitation: Ativan 1mg IM q6h

## 2019-07-26 NOTE — ED BEHAVIORAL HEALTH ASSESSMENT NOTE - DETAILS
Loxitane (unknown rxn), Celebrex - hives one month of "jerking" of the muscles in his arms [facility] see HPI maternal aunt and uncle with unknown mental illnesses requiring long-term hospitalization Father was shot and killed when father was 55, no PTSD sx from this. spoke to patient's wife, gave her information for Low 5 unknown provider at [facility] Admit to L5, handoff given to Dr. Kerr

## 2019-07-26 NOTE — ED BEHAVIORAL HEALTH NOTE - BEHAVIORAL HEALTH NOTE
C-SSRS Screener     1. Have you ever wished to be dead or wished you could go to sleep and not wake up?  [x  ]Yes, [  ]No, [  ]Unable to Assess  Details _See HPI____________________________     2. Have you actually had any thoughts of killing yourself?   [ x ]Yes, [  ]No, [  ]Unable to Assess  Details _See HPI____________________________     If answer is “No” for 1 and 2, stop here. If answer is “Yes” to 1 or 2, proceed to 3.     3. Have you been thinking about how you might kill yourself?  [  ]Yes, [x  ]No, [  ]Unable to Assess  Details _____________________________     4. Have you had these thoughts and had some intention of acting on them?  [ x ]Yes, [  ]No, [  ]Unable to Assess  Details _See HPI____________________________     5. Have you started to work out or worked out the details of how to kill yourself? Do you intend to carry out this plan?  [  ]Yes, [x  ]No, [  ]Unable to Assess  Details _____________________________     6. Have you ever done anything, started to do anything, or prepared to do anything to end your life? If so, was it in the past 3 months?  [  ]Yes, [x  ]No, [  ]Unable to Assess  Details _____________________________        Additional Suicide Risk Factors (select all that apply)  [  ]Access to lethal means including firearms  [  ]Family history of suicide  [x  ]Impulsivity  [ x ] Current or past mood disorder  [  ] Current or past psychotic disorder  [  ] Current or past PTSD  [  ] Current or past ADHD  [  ] Current or past TBI  [  ] Current or past cluster B personality disorder or traits  [  ] Current or past conduct problems  [  ] Recent onset of current or past psychiatric disorder  [x  ] Family history of psychiatric diagnoses requiring hospitalization     Additional Activating Events (select all that apply)  [ x ]Perceived burden on family or others  [  ]Current sexual or physical abuse  [  ]Substance intoxication or withdrawal  [ x ]Inadequate social supports  [  ]Hopeless about or dissatisfied with current provider or treatment     Additional Protective Factors (select all that apply)  [ x ] Future plans  [  ] Scientologist beliefs  [  ] Beloved pets

## 2019-07-26 NOTE — ED BEHAVIORAL HEALTH ASSESSMENT NOTE - RISK ASSESSMENT
Patient appears to be a high risk of harm to self or others at this time given worsening depressive symptoms with intermittent active SI, requiring hospitalization for stabilization and safety.    Risk factors: depression, severe anxiety, recent hx of active SI with ambivalent intent, impulsivity, prior hospitalizations, positive family hx, functional decline, poor reactivity to stressors, difficulty expressing emotions, global insomnia, failed outpatient tx, hopelessness    Protective factors: no hx of prior attempts, no self-harm behaviors, identifies reasons for living, future oriented, supportive family, no active substance use, no access to firearms, no legal issues, engaged in treatment, med compliant    See C-SSRS in  note.

## 2019-07-26 NOTE — ED PROVIDER NOTE - CLINICAL SUMMARY MEDICAL DECISION MAKING FREE TEXT BOX
pt p/w worsening bipolar disorder, anxiety. requesting voluntary admission. will be evaluated by psych. dispo as per psych

## 2019-07-26 NOTE — ED BEHAVIORAL HEALTH ASSESSMENT NOTE - SUICIDE RISK FACTORS
Hopelessness/Agitation/severe anxiety/Perceived burden on family and others/Unable to engage in safety planning/Mood episode/Global insomnia/Anhedonia/History of abuse/trauma

## 2019-07-26 NOTE — ED PROVIDER NOTE - OBJECTIVE STATEMENT
"I've been having severe anxiety and suicidal ideation...I don't feel safe"  Patient is a 69 year old, male; domiciled with wife of many years; with one adult daughter, retired , with long h/o Bipolar disorder; with multiple prior psychiatric hospitalizations (last one was at [facility] for one month, discharged two weeks ago), several of the hospitalizations for ollie but most recent for depression; no known suicide attempts; no known history of violence or arrests; no active substance abuse or known history of complicated withdrawal; with PMhx of DM, HLD, and hypothyroidism who walked in to ER accompanied by wife for evaluation of intolerable anxiety, depression, and worsening suicidal ideation that is becoming active.      Patient reports that he had been stable for over 20 years until the holidays 2018 when he had a manic episodes during which he reports several weeks of increased energy, decreased need for sleep, more impulsive behavior, increase in cooking, following supplements and organic diets.  He had been taking medication consistently. He followed up with his psychiatrist Dr. Live who adjusted the medications and he fell into a depressive episode starting in March 2019. Patient then presented to [facility] ED in June 2019 reporting that for the last month he had been increasingly anxious and medication adjustments by psychiatrist did not work.  He began to experience suicidal thoughts at that time. Patient was admitted to [facility] for one month, had failed trial of Seroquel, and then was put on Lithium, without relief of symptoms. He was discharged but he feels this was too soon and that he was still very symptomatic. Over the past two weeks, patient complains of worsening depressed mood, insomnia, poor appetite (20lb weight loss in past 2 months), low energy, anhedonia, guilt, severe anxiety, and worsening SI that began as passive but has become active. Patient reports intermittent thoughts of ending his life with no specific plan and ambivalent intent. Reports no preparatory acts for suicide, no attempts, no self-injury. Patient reports his biggest psychosocial stressor as "anxiety about never getting better". Also reports stress about his wife leaving for vacation next week. Patient expresses worsening frustration that many medication changes have failed and inquires about ECT. He requests admission for safety and relief of his severe symptoms.    Patient denies any current manic symptoms including elevated mood, increased irritability, mood lability, distractibility, grandiosity, pressured speech, increase in goal-directed activity, or decreased need for sleep. Patient denies any current or prior psychotic symptoms including paranoia, ideas of reference, thought insertion/broadcasting, or auditory/visual/olfactory/tactile/gustatory hallucinations. Patient denies current or prior substance abuse. On ROS, patient endorses one month of "jerking" of the muscles in his arms.

## 2019-07-26 NOTE — ED ADULT NURSE NOTE - CHIEF COMPLAINT QUOTE
pt comes to ED for psych eval. pt has hx of bi polar complaint with meds. pt stood one month in [facility]. pt states he is nervous  because his wife is going on a trip. pt thought about hurting himself. pt denies HI drug ETOH or voices. pt is calm and cooperative at this time MD Rea called to triage for psych eval. pt VSS NAD    please call wife  sam

## 2019-07-26 NOTE — ED ADULT TRIAGE NOTE - CHIEF COMPLAINT QUOTE
pt comes to ED for psych eval. pt has hx of bi polar complaint with meds. pt stood one month in [facility]. pt states he is nervous  because his wife is going on a trip. pt thought about hurting himself. pt denies HI drug ETOH or voices. pt is calm and cooperative at this time MD Rea called to triage for psych eval. pt VSS NAD pt comes to ED for psych eval. pt has hx of bi polar complaint with meds. pt stood one month in [facility]. pt states he is nervous  because his wife is going on a trip. pt thought about hurting himself. pt denies HI drug ETOH or voices. pt is calm and cooperative at this time MD Rea called to triage for psych eval. pt VSS NAD    please call wife  sam

## 2019-07-26 NOTE — ED BEHAVIORAL HEALTH ASSESSMENT NOTE - CASE SUMMARY
Patient is a 69 year old, male; domiciled with wife of many years; with one adult daughter, retired , with long h/o Bipolar disorder; with multiple prior psychiatric hospitalizations (last one was at [facility] for one month, discharged two weeks ago), several of the hospitalizations for ollie but most recent for depression; no known suicide attempts; no known history of violence or arrests; no active substance abuse or known history of complicated withdrawal; with PMhx of DM, HLD, and hypothyroidism who walked in to ER accompanied by wife for evaluation of intolerable anxiety, depression, and worsening suicidal ideation that is becoming active.    Patient complains of worsening depressed mood, insomnia, poor appetite (20lb weight loss in past 2 months), low energy, anhedonia, guilt, severe anxiety, and worsening SI that began as passive but has become active. Patient reports intermittent thoughts of ending his life with no specific plan and ambivalent intent. Patient expresses worsening frustration that many medication changes have failed and inquires about ECT. He requests admission for safety and relief of his severe symptoms. He reports medications are no longer working and he is unable to function at home. Patient is considered high risk, is requesting voluntary inpatient hospitalization, and is appropriate for this level of care given acute risk of harm to self and numerous failed outpatient medication trials.

## 2019-07-26 NOTE — ED PROVIDER NOTE - PSYCHIATRIC, MLM
Alert and oriented to person, place, time/situation. normal mood and affect. no apparent risk to self or others. pt sig anxiety, racing thoughts. no si/hi

## 2019-07-26 NOTE — ED PROVIDER NOTE - CONSTITUTIONAL NEGATIVE STATEMENT, MLM
Minocycline Counseling: Patient advised regarding possible photosensitivity and discoloration of the teeth, skin, lips, tongue and gums. Patient instructed to avoid sunlight, if possible. When exposed to sunlight, patients should wear protective clothing, sunglasses, and sunscreen. The patient was instructed to call the office immediately if the following severe adverse effects occur:  hearing changes, easy bruising/bleeding, severe headache, or vision changes. The patient verbalized understanding of the proper use and possible adverse effects of minocycline. All of the patient's questions and concerns were addressed. Clofazimine Counseling:  I discussed with the patient the risks of clofazimine including but not limited to skin and eye pigmentation, liver damage, nausea/vomiting, gastrointestinal bleeding and allergy. Topical Retinoid Pregnancy And Lactation Text: This medication is Pregnancy Category C. It is unknown if this medication is excreted in breast milk. no fever and no chills. Nsaids Pregnancy And Lactation Text: These medications are considered safe up to 30 weeks gestation. It is excreted in breast milk. Rituxan Pregnancy And Lactation Text: This medication is Pregnancy Category C and it isn't know if it is safe during pregnancy. It is unknown if this medication is excreted in breast milk but similar antibodies are known to be excreted. Azathioprine Counseling:  I discussed with the patient the risks of azathioprine including but not limited to myelosuppression, immunosuppression, hepatotoxicity, lymphoma, and infections. The patient understands that monitoring is required including baseline LFTs, Creatinine, possible TPMP genotyping and weekly CBCs for the first month and then every 2 weeks thereafter. The patient verbalized understanding of the proper use and possible adverse effects of azathioprine. All of the patient's questions and concerns were addressed. Ketoconazole Pregnancy And Lactation Text: This medication is Pregnancy Category C and it isn't know if it is safe during pregnancy. It is also excreted in breast milk and breast feeding isn't recommended. High Dose Vitamin A Pregnancy And Lactation Text: High dose vitamin A therapy is contraindicated during pregnancy and breast feeding. Hydroquinone Counseling:  Patient advised that medication may result in skin irritation, lightening (hypopigmentation), dryness, and burning. In the event of skin irritation, the patient was advised to reduce the amount of the drug applied or use it less frequently. Rarely, spots that are treated with hydroquinone can become darker (pseudoochronosis). Should this occur, patient instructed to stop medication and call the office. The patient verbalized understanding of the proper use and possible adverse effects of hydroquinone. All of the patient's questions and concerns were addressed. Odomzo Counseling- I discussed with the patient the risks of Odomzo including but not limited to nausea, vomiting, diarrhea, constipation, weight loss, changes in the sense of taste, decreased appetite, muscle spasms, and hair loss. The patient verbalized understanding of the proper use and possible adverse effects of Odomzo. All of the patient's questions and concerns were addressed. Siliq Counseling:  I discussed with the patient the risks of Siliq including but not limited to new or worsening depression, suicidal thoughts and behavior, immunosuppression, malignancy, posterior leukoencephalopathy syndrome, and serious infections. The patient understands that monitoring is required including a PPD at baseline and must alert us or the primary physician if symptoms of infection or other concerning signs are noted. There is also a special program designed to monitor depression which is required with Siliq. Cimzia Counseling:  I discussed with the patient the risks of Cimzia including but not limited to immunosuppression, allergic reactions and infections. The patient understands that monitoring is required including a PPD at baseline and must alert us or the primary physician if symptoms of infection or other concerning signs are noted. Terbinafine Counseling: Patient counseling regarding adverse effects of terbinafine including but not limited to headache, diarrhea, rash, upset stomach, liver function test abnormalities, itching, taste/smell disturbance, nausea, abdominal pain, and flatulence. There is a rare possibility of liver failure that can occur when taking terbinafine. The patient understands that a baseline LFT and kidney function test may be required. The patient verbalized understanding of the proper use and possible adverse effects of terbinafine. All of the patient's questions and concerns were addressed. Minocycline Pregnancy And Lactation Text: This medication is Pregnancy Category D and not consider safe during pregnancy. It is also excreted in breast milk. Hydroquinone Pregnancy And Lactation Text: This medication has not been assigned a Pregnancy Risk Category but animal studies failed to show danger with the topical medication. It is unknown if the medication is excreted in breast milk. Cyclophosphamide Pregnancy And Lactation Text: This medication is Pregnancy Category D and it isn't considered safe during pregnancy. This medication is excreted in breast milk. Tazorac Counseling:  Patient advised that medication is irritating and drying. Patient may need to apply sparingly and wash off after an hour before eventually leaving it on overnight. The patient verbalized understanding of the proper use and possible adverse effects of tazorac. All of the patient's questions and concerns were addressed. Azathioprine Pregnancy And Lactation Text: This medication is Pregnancy Category D and isn't considered safe during pregnancy. It is unknown if this medication is excreted in breast milk. Clofazimine Pregnancy And Lactation Text: This medication is Pregnancy Category C and isn't considered safe during pregnancy. It is excreted in breast milk. Use Enhanced Medication Counseling?: No Quinolones Counseling:  I discussed with the patient the risks of fluoroquinolones including but not limited to GI upset, allergic reaction, drug rash, diarrhea, dizziness, photosensitivity, yeast infections, liver function test abnormalities, tendonitis/tendon rupture. Cimzia Pregnancy And Lactation Text: This medication crosses the placenta but can be considered safe in certain situations. Cimzia may be excreted in breast milk. Azithromycin Counseling:  I discussed with the patient the risks of azithromycin including but not limited to GI upset, allergic reaction, drug rash, diarrhea, and yeast infections. Terbinafine Pregnancy And Lactation Text: This medication is Pregnancy Category B and is considered safe during pregnancy. It is also excreted in breast milk and breast feeding isn't recommended. Cellcept Counseling:  I discussed with the patient the risks of mycophenolate mofetil including but not limited to infection/immunosuppression, GI upset, hypokalemia, hypercholesterolemia, bone marrow suppression, lymphoproliferative disorders, malignancy, GI ulceration/bleed/perforation, colitis, interstitial lung disease, kidney failure, progressive multifocal leukoencephalopathy, and birth defects. The patient understands that monitoring is required including a baseline creatinine and regular CBC testing. In addition, patient must alert us immediately if symptoms of infection or other concerning signs are noted. Siliq Pregnancy And Lactation Text: The risk during pregnancy and breastfeeding is uncertain with this medication. Odomzo Pregnancy And Lactation Text: This medication is Pregnancy Category X and is absolutely contraindicated during pregnancy. It is unknown if it is excreted in breast milk. Colchicine Counseling:  Patient counseled regarding adverse effects including but not limited to stomach upset (nausea, vomiting, stomach pain, or diarrhea). Patient instructed to limit alcohol consumption while taking this medication. Colchicine may reduce blood counts especially with prolonged use. The patient understands that monitoring of kidney function and blood counts may be required, especially at baseline. The patient verbalized understanding of the proper use and possible adverse effects of colchicine. All of the patient's questions and concerns were addressed. Tazorac Pregnancy And Lactation Text: This medication is not safe during pregnancy. It is unknown if this medication is excreted in breast milk. Imiquimod Counseling:  I discussed with the patient the risks of imiquimod including but not limited to erythema, scaling, itching, weeping, crusting, and pain. Patient understands that the inflammatory response to imiquimod is variable from person to person and was educated regarded proper titration schedule. If flu-like symptoms develop, patient knows to discontinue the medication and contact us. Cyclosporine Counseling:  I discussed with the patient the risks of cyclosporine including but not limited to hypertension, gingival hyperplasia,myelosuppression, immunosuppression, liver damage, kidney damage, neurotoxicity, lymphoma, and serious infections. The patient understands that monitoring is required including baseline blood pressure, CBC, CMP, lipid panel and uric acid, and then 1-2 times monthly CMP and blood pressure. Azithromycin Pregnancy And Lactation Text: This medication is considered safe during pregnancy and is also secreted in breast milk. Quinolones Pregnancy And Lactation Text: This medication is Pregnancy Category C and it isn't know if it is safe during pregnancy. It is also excreted in breast milk. Cosentyx Counseling:  I discussed with the patient the risks of Cosentyx including but not limited to worsening of Crohn's disease, immunosuppression, allergic reactions and infections. The patient understands that monitoring is required including a PPD at baseline and must alert us or the primary physician if symptoms of infection or other concerning signs are noted. Simponi Counseling:  I discussed with the patient the risks of golimumab including but not limited to myelosuppression, immunosuppression, autoimmune hepatitis, demyelinating diseases, lymphoma, and serious infections. The patient understands that monitoring is required including a PPD at baseline and must alert us or the primary physician if symptoms of infection or other concerning signs are noted. Otezla Counseling: [place] Counseling: The side effects of [place] were discussed with the patient, including but not limited to worsening or new depression, weight loss, diarrhea, nausea, upper respiratory tract infection, and headache. Patient instructed to call the office should any adverse effect occur. The patient verbalized understanding of the proper use and possible adverse effects of Otezla. All the patient's questions and concerns were addressed. Cyclosporine Pregnancy And Lactation Text: This medication is Pregnancy Category C and it isn't know if it is safe during pregnancy. This medication is excreted in breast milk. Topical Clindamycin Counseling: Patient counseled that this medication may cause skin irritation or allergic reactions. In the event of skin irritation, the patient was advised to reduce the amount of the drug applied or use it less frequently. The patient verbalized understanding of the proper use and possible adverse effects of clindamycin. All of the patient's questions and concerns were addressed. Bactrim Counseling:  I discussed with the patient the risks of sulfa antibiotics including but not limited to GI upset, allergic reaction, drug rash, diarrhea, dizziness, photosensitivity, and yeast infections. Rarely, more serious reactions can occur including but not limited to aplastic anemia, agranulocytosis, methemoglobinemia, blood dyscrasias, liver or kidney failure, lung infiltrates or desquamative/blistering drug rashes. Cyclophosphamide Counseling:  I discussed with the patient the risks of cyclophosphamide including but not limited to hair loss, hormonal abnormalities, decreased fertility, abdominal pain, diarrhea, nausea and vomiting, bone marrow suppression and infection. The patient understands that monitoring is required while taking this medication. Benzoyl Peroxide Counseling: Patient counseled that medicine may cause skin irritation and bleach clothing. In the event of skin irritation, the patient was advised to reduce the amount of the drug applied or use it less frequently. The patient verbalized understanding of the proper use and possible adverse effects of benzoyl peroxide. All of the patient's questions and concerns were addressed. Otezla Pregnancy And Lactation Text: This medication is Pregnancy Category C and it isn't known if it is safe during pregnancy. It is unknown if it is excreted in breast milk. Cosentyx Pregnancy And Lactation Text: This medication is Pregnancy Category B and is considered safe during pregnancy. It is unknown if this medication is excreted in breast milk. Rifampin Counseling: I discussed with the patient the risks of rifampin including but not limited to liver damage, kidney damage, red-orange body fluids, nausea/vomiting and severe allergy. Methotrexate Counseling:  Patient counseled regarding adverse effects of methotrexate including but not limited to nausea, vomiting, abnormalities in liver function tests. Patients may develop mouth sores, rash, diarrhea, and abnormalities in blood counts. The patient understands that monitoring is required including LFT's and blood counts. There is a rare possibility of scarring of the liver and lung problems that can occur when taking methotrexate. Persistent nausea, loss of appetite, pale stools, dark urine, cough, and shortness of breath should be reported immediately. Patient advised to discontinue methotrexate treatment at least three months before attempting to become pregnant. I discussed the need for folate supplements while taking methotrexate. These supplements can decrease side effects during methotrexate treatment. The patient verbalized understanding of the proper use and possible adverse effects of methotrexate. All of the patient's questions and concerns were addressed. Topical Clindamycin Pregnancy And Lactation Text: This medication is Pregnancy Category B and is considered safe during pregnancy. It is unknown if it is excreted in breast milk. Dapsone Counseling: I discussed with the patient the risks of dapsone including but not limited to hemolytic anemia, agranulocytosis, rashes, methemoglobinemia, kidney failure, peripheral neuropathy, headaches, GI upset, and liver toxicity. Patients who start dapsone require monitoring including baseline LFTs and weekly CBCs for the first month, then every month thereafter. The patient verbalized understanding of the proper use and possible adverse effects of dapsone. All of the patient's questions and concerns were addressed. Cimetidine Counseling:  I discussed with the patient the risks of Cimetidine including but not limited to gynecomastia, headache, diarrhea, nausea, drowsiness, arrhythmias, pancreatitis, skin rashes, psychosis, bone marrow suppression and kidney toxicity. Minoxidil Counseling: Minoxidil is a topical medication which can increase blood flow where it is applied. It is uncertain how this medication increases hair growth. Side effects are uncommon and include stinging and allergic reactions. Benzoyl Peroxide Pregnancy And Lactation Text: This medication is Pregnancy Category C. It is unknown if benzoyl peroxide is excreted in breast milk. Rifampin Pregnancy And Lactation Text: This medication is Pregnancy Category C and it isn't know if it is safe during pregnancy. It is also excreted in breast milk and should not be used if you are breast feeding. Mirvaso Counseling: Jacquelene Loupe is a topical medication which can decrease superficial blood flow where applied. Side effects are uncommon and include stinging, redness and allergic reactions. Bactrim Pregnancy And Lactation Text: This medication is Pregnancy Category D and is known to cause fetal risk. It is also excreted in breast milk. Dupixent Counseling: I discussed with the patient the risks of dupilumab including but not limited to eye infection and irritation, cold sores, injection site reactions, worsening of asthma, allergic reactions and increased risk of parasitic infection. Live vaccines should be avoided while taking dupilumab. Dupilumab will also interact with certain medications such as warfarin and cyclosporine. The patient understands that monitoring is required and they must alert us or the primary physician if symptoms of infection or other concerning signs are noted. Stelara Counseling:  I discussed with the patient the risks of ustekinumab including but not limited to immunosuppression, malignancy, posterior leukoencephalopathy syndrome, and serious infections. The patient understands that monitoring is required including a PPD at baseline and must alert us or the primary physician if symptoms of infection or other concerning signs are noted. Dapsone Pregnancy And Lactation Text: This medication is Pregnancy Category C and is not considered safe during pregnancy or breast feeding. Oxybutynin Counseling:  I discussed with the patient the risks of oxybutynin including but not limited to skin rash, drowsiness, dry mouth, difficulty urinating, and blurred vision. Methotrexate Pregnancy And Lactation Text: This medication is Pregnancy Category X and is known to cause fetal harm. This medication is excreted in breast milk. Topical Sulfur Applications Counseling: Topical Sulfur Counseling: Patient counseled that this medication may cause skin irritation or allergic reactions. In the event of skin irritation, the patient was advised to reduce the amount of the drug applied or use it less frequently. The patient verbalized understanding of the proper use and possible adverse effects of topical sulfur application. All of the patient's questions and concerns were addressed. Cephalexin Counseling: I counseled the patient regarding use of cephalexin as an antibiotic for prophylactic and/or therapeutic purposes. Cephalexin (commonly prescribed under brand name Keflex) is a cephalosporin antibiotic which is active against numerous classes of bacteria, including most skin bacteria. Side effects may include nausea, diarrhea, gastrointestinal upset, rash, hives, yeast infections, and in rare cases, hepatitis, kidney disease, seizures, fever, confusion, neurologic symptoms, and others. Patients with severe allergies to penicillin medications are cautioned that there is about a 10% incidence of cross-reactivity with cephalosporins. When possible, patients with penicillin allergies should use alternatives to cephalosporins for antibiotic therapy. Tetracycline Counseling: Patient counseled regarding possible photosensitivity and increased risk for sunburn. Patient instructed to avoid sunlight, if possible. When exposed to sunlight, patients should wear protective clothing, sunglasses, and sunscreen. The patient was instructed to call the office immediately if the following severe adverse effects occur:  hearing changes, easy bruising/bleeding, severe headache, or vision changes. The patient verbalized understanding of the proper use and possible adverse effects of tetracycline. All of the patient's questions and concerns were addressed. Patient understands to avoid pregnancy while on therapy due to potential birth defects. Dupixent Pregnancy And Lactation Text: This medication likely crosses the placenta but the risk for the fetus is uncertain. This medication is excreted in breast milk. Carac Counseling:  I discussed with the patient the risks of Carac including but not limited to erythema, scaling, itching, weeping, crusting, and pain. Erivedge Counseling- I discussed with the patient the risks of Erivedge including but not limited to nausea, vomiting, diarrhea, constipation, weight loss, changes in the sense of taste, decreased appetite, muscle spasms, and hair loss. The patient verbalized understanding of the proper use and possible adverse effects of Erivedge. All of the patient's questions and concerns were addressed. Prednisone Counseling:  I discussed with the patient the risks of prolonged use of prednisone including but not limited to weight gain, insomnia, osteoporosis, mood changes, diabetes, susceptibility to infection, glaucoma and high blood pressure. In cases where prednisone use is prolonged, patients should be monitored with blood pressure checks, serum glucose levels and an eye exam.  Additionally, the patient may need to be placed on GI prophylaxis, PCP prophylaxis, and calcium and vitamin D supplementation and/or a bisphosphonate. The patient verbalized understanding of the proper use and the possible adverse effects of prednisone. All of the patient's questions and concerns were addressed. Topical Sulfur Applications Pregnancy And Lactation Text: This medication is Pregnancy Category C and has an unknown safety profile during pregnancy. It is unknown if this topical medication is excreted in breast milk. Doxepin Counseling:  Patient advised that the medication is sedating and not to drive a car after taking this medication. Patient informed of potential adverse effects including but not limited to dry mouth, urinary retention, and blurry vision. The patient verbalized understanding of the proper use and possible adverse effects of doxepin. All of the patient's questions and concerns were addressed. Enbrel Counseling:  I discussed with the patient the risks of etanercept including but not limited to myelosuppression, immunosuppression, autoimmune hepatitis, demyelinating diseases, lymphoma, and infections. The patient understands that monitoring is required including a PPD at baseline and must alert us or the primary physician if symptoms of infection or other concerning signs are noted. Wartpeel Pregnancy And Lactation Text: This medication is Pregnancy Category X and contraindicated in pregnancy and in women who may become pregnant. It is unknown if this medication is excreted in breast milk. Cephalexin Pregnancy And Lactation Text: This medication is Pregnancy Category B and considered safe during pregnancy. It is also excreted in breast milk but can be used safely for shorter doses. Picato Counseling:  I discussed with the patient the risks of Picato including but not limited to erythema, scaling, itching, weeping, crusting, and pain. Birth Control Pills Counseling: Birth Control Pill Counseling: I discussed with the patient the potential side effects of OCPs including but not limited to increased risk of stroke, heart attack, thrombophlebitis, deep venous thrombosis, hepatic adenomas, breast changes, GI upset, headaches, and depression. The patient verbalized understanding of the proper use and possible adverse effects of OCPs. All of the patient's questions and concerns were addressed. Taltz Counseling: I discussed with the patient the risks of ixekizumab including but not limited to immunosuppression, serious infections, worsening of inflammatory bowel disease and drug reactions. The patient understands that monitoring is required including a PPD at baseline and must alert us or the primary physician if symptoms of infection or other concerning signs are noted. Doxepin Pregnancy And Lactation Text: This medication is Pregnancy Category C and it isn't known if it is safe during pregnancy. It is also excreted in breast milk and breast feeding isn't recommended. Mirvaso Pregnancy And Lactation Text: This medication has not been assigned a Pregnancy Risk Category. It is unknown if the medication is excreted in breast milk. Wartpeel Counseling:  I discussed with the patient the risks of Wartpeel including but not limited to erythema, scaling, itching, weeping, crusting, and pain. Clindamycin Counseling: I counseled the patient regarding use of clindamycin as an antibiotic for prophylactic and/or therapeutic purposes. Clindamycin is active against numerous classes of bacteria, including skin bacteria. Side effects may include nausea, diarrhea, gastrointestinal upset, rash, hives, yeast infections, and in rare cases, colitis. 5-Fu Counseling: 5-Fluorouracil Counseling:  I discussed with the patient the risks of 5-fluorouracil including but not limited to erythema, scaling, itching, weeping, crusting, and pain. Hydroxyzine Counseling: Patient advised that the medication is sedating and not to drive a car after taking this medication. Patient informed of potential adverse effects including but not limited to dry mouth, urinary retention, and blurry vision. The patient verbalized understanding of the proper use and possible adverse effects of hydroxyzine. All of the patient's questions and concerns were addressed. Gabapentin Counseling: I discussed with the patient the risks of gabapentin including but not limited to dizziness, somnolence, fatigue and ataxia. Birth Control Pills Pregnancy And Lactation Text: This medication should be avoided if pregnant and for the first 30 days post-partum. Clindamycin Pregnancy And Lactation Text: This medication can be used in pregnancy if certain situations. Clindamycin is also present in breast milk. Protopic Counseling: Patient may experience a mild burning sensation during topical application. Protopic is not approved in children less than 3years of age. There have been case reports of hematologic and skin malignancies in patients using topical calcineurin inhibitors although causality is questionable. Fluconazole Counseling:  Patient counseled regarding adverse effects of fluconazole including but not limited to headache, diarrhea, nausea, upset stomach, liver function test abnormalities, taste disturbance, and stomach pain. There is a rare possibility of liver failure that can occur when taking fluconazole. The patient understands that monitoring of LFTs and kidney function test may be required, especially at baseline. The patient verbalized understanding of the proper use and possible adverse effects of fluconazole. All of the patient's questions and concerns were addressed. Tremfya Counseling: I discussed with the patient the risks of guselkumab including but not limited to immunosuppression, serious infections, worsening of inflammatory bowel disease and drug reactions. The patient understands that monitoring is required including a PPD at baseline and must alert us or the primary physician if symptoms of infection or other concerning signs are noted. Humira Counseling:  I discussed with the patient the risks of adalimumab including but not limited to myelosuppression, immunosuppression, autoimmune hepatitis, demyelinating diseases, lymphoma, and serious infections. The patient understands that monitoring is required including a PPD at baseline and must alert us or the primary physician if symptoms of infection or other concerning signs are noted. Hydroxyzine Pregnancy And Lactation Text: This medication is not safe during pregnancy and should not be taken. It is also excreted in breast milk and breast feeding isn't recommended. Spironolactone Counseling: Patient advised regarding risks of diarrhea, abdominal pain, hyperkalemia, birth defects (for female patients), liver toxicity and renal toxicity. The patient may need blood work to monitor liver and kidney function and potassium levels while on therapy. The patient verbalized understanding of the proper use and possible adverse effects of spironolactone. All of the patient's questions and concerns were addressed. Acitretin Counseling:  I discussed with the patient the risks of acitretin including but not limited to hair loss, dry lips/skin/eyes, liver damage, hyperlipidemia, depression/suicidal ideation, photosensitivity. Serious rare side effects can include but are not limited to pancreatitis, pseudotumor cerebri, bony changes, clot formation/stroke/heart attack. Patient understands that alcohol is contraindicated since it can result in liver toxicity and significantly prolong the elimination of the drug by many years. Zyclara Counseling:  I discussed with the patient the risks of imiquimod including but not limited to erythema, scaling, itching, weeping, crusting, and pain. Patient understands that the inflammatory response to imiquimod is variable from person to person and was educated regarded proper titration schedule. If flu-like symptoms develop, patient knows to discontinue the medication and contact us. Protopic Pregnancy And Lactation Text: This medication is Pregnancy Category C. It is unknown if this medication is excreted in breast milk when applied topically. Doxycycline Counseling:  Patient counseled regarding possible photosensitivity and increased risk for sunburn. Patient instructed to avoid sunlight, if possible. When exposed to sunlight, patients should wear protective clothing, sunglasses, and sunscreen. The patient was instructed to call the office immediately if the following severe adverse effects occur:  hearing changes, easy bruising/bleeding, severe headache, or vision changes. The patient verbalized understanding of the proper use and possible adverse effects of doxycycline. All of the patient's questions and concerns were addressed. Drysol Counseling:  I discussed with the patient the risks of drysol/aluminum chloride including but not limited to skin rash, itching, irritation, burning. Spironolactone Pregnancy And Lactation Text: This medication can cause feminization of the male fetus and should be avoided during pregnancy. The active metabolite is also found in breast milk. Acitretin Pregnancy And Lactation Text: This medication is Pregnancy Category X and should not be given to women who are pregnant or may become pregnant in the future. This medication is excreted in breast milk. Glycopyrrolate Counseling:  I discussed with the patient the risks of glycopyrrolate including but not limited to skin rash, drowsiness, dry mouth, difficulty urinating, and blurred vision. Doxycycline Pregnancy And Lactation Text: This medication is Pregnancy Category D and not consider safe during pregnancy. It is also excreted in breast milk but is considered safe for shorter treatment courses. Albendazole Pregnancy And Lactation Text: This medication is Pregnancy Category C and it isn't known if it is safe during pregnancy. It is also excreted in breast milk. Drysol Pregnancy And Lactation Text: This medication is considered safe during pregnancy and breast feeding. Ilumya Counseling: I discussed with the patient the risks of tildrakizumab including but not limited to immunosuppression, malignancy, posterior leukoencephalopathy syndrome, and serious infections. The patient understands that monitoring is required including a PPD at baseline and must alert us or the primary physician if symptoms of infection or other concerning signs are noted. Griseofulvin Counseling:  I discussed with the patient the risks of griseofulvin including but not limited to photosensitivity, cytopenia, liver damage, nausea/vomiting and severe allergy. The patient understands that this medication is best absorbed when taken with a fatty meal (e.g., ice cream or french fries). Rhofade Counseling: Rhofade is a topical medication which can decrease superficial blood flow where applied. Side effects are uncommon and include stinging, redness and allergic reactions. Xeljanz Counseling: Luis Carlos Rachel Counseling: I discussed with the patient the risks of Luis Carlos Rachel therapy including increased risk of infection, liver issues, headache, diarrhea, or cold symptoms. Live vaccines should be avoided. They were instructed to call if they have any problems. Opioid Counseling: I discussed with the patient the potential side effects of opioids including but not limited to addiction, altered mental status, and depression. I stressed avoiding alcohol, benzodiazepines, muscle relaxants and sleep aids unless specifically okayed by a physician. The patient verbalized understanding of the proper use and possible adverse effects of opioids. All of the patient's questions and concerns were addressed. They were instructed to flush the remaining pills down the toilet if they did not need them for pain. Glycopyrrolate Pregnancy And Lactation Text: This medication is Pregnancy Category B and is considered safe during pregnancy. It is unknown if it is excreted breast milk. SSKI Counseling:  I discussed with the patient the risks of SSKI including but not limited to thyroid abnormalities, metallic taste, GI upset, fever, headache, acne, arthralgias, paraesthesias, lymphadenopathy, easy bleeding, arrhythmias, and allergic reaction. Albendazole Counseling:  I discussed with the patient the risks of albendazole including but not limited to cytopenia, kidney damage, nausea/vomiting and severe allergy. The patient understands that this medication is being used in an off-label manner. Bexarotene Counseling:  I discussed with the patient the risks of bexarotene including but not limited to hair loss, dry lips/skin/eyes, liver abnormalities, hyperlipidemia, pancreatitis, depression/suicidal ideation, photosensitivity, drug rash/allergic reactions, hypothyroidism, anemia, leukopenia, infection, cataracts, and teratogenicity. Patient understands that they will need regular blood tests to check lipid profile, liver function tests, white blood cell count, thyroid function tests and pregnancy test if applicable. Thalidomide Counseling: I discussed with the patient the risks of thalidomide including but not limited to birth defects, anxiety, weakness, chest pain, dizziness, cough and severe allergy. Erythromycin Counseling:  I discussed with the patient the risks of erythromycin including but not limited to GI upset, allergic reaction, drug rash, diarrhea, increase in liver enzymes, and yeast infections. Opioid Pregnancy And Lactation Text: These medications can lead to premature delivery and should be avoided during pregnancy. These medications are also present in breast milk in small amounts. Hydroxychloroquine Pregnancy And Lactation Text: This medication has been shown to cause fetal harm but it isn't assigned a Pregnancy Risk Category. There are small amounts excreted in breast milk. Griseofulvin Pregnancy And Lactation Text: This medication is Pregnancy Category X and is known to cause serious birth defects. It is unknown if this medication is excreted in breast milk but breast feeding should be avoided. Xelieshaz Pregnancy And Lactation Text: This medication is Pregnancy Category D and is not considered safe during pregnancy. The risk during breast feeding is also uncertain. Elidel Counseling: Patient may experience a mild burning sensation during topical application. Elidel is not approved in children less than 3years of age. There have been case reports of hematologic and skin malignancies in patients using topical calcineurin inhibitors although causality is questionable. Hydroxychloroquine Counseling:  I discussed with the patient that a baseline ophthalmologic exam is needed at the start of therapy and every year thereafter while on therapy. A CBC may also be warranted for monitoring. The side effects of this medication were discussed with the patient, including but not limited to agranulocytosis, aplastic anemia, seizures, rashes, retinopathy, and liver toxicity. Patient instructed to call the office should any adverse effect occur. The patient verbalized understanding of the proper use and possible adverse effects of Plaquenil. All the patient's questions and concerns were addressed. Bexarotene Pregnancy And Lactation Text: This medication is Pregnancy Category X and should not be given to women who are pregnant or may become pregnant. This medication should not be used if you are breast feeding. Detail Level: Simple Sski Pregnancy And Lactation Text: This medication is Pregnancy Category D and isn't considered safe during pregnancy. It is excreted in breast milk. Niacinamide Counseling: I recommended taking niacin or niacinamide, also know as vitamin B3, twice daily. Recent evidence suggests that taking vitamin B3 (500 mg twice daily) can reduce the risk of actinic keratoses and non-melanoma skin cancers. Side effects of vitamin B3 include flushing and headache. Infliximab Counseling:  I discussed with the patient the risks of infliximab including but not limited to myelosuppression, immunosuppression, autoimmune hepatitis, demyelinating diseases, lymphoma, and serious infections. The patient understands that monitoring is required including a PPD at baseline and must alert us or the primary physician if symptoms of infection or other concerning signs are noted. Xolair Counseling:  Patient informed of potential adverse effects including but not limited to fever, muscle aches, rash and allergic reactions. The patient verbalized understanding of the proper use and possible adverse effects of Xolair. All of the patient's questions and concerns were addressed. Itraconazole Counseling:  I discussed with the patient the risks of itraconazole including but not limited to liver damage, nausea/vomiting, neuropathy, and severe allergy. The patient understands that this medication is best absorbed when taken with acidic beverages such as non-diet cola or ginger ale. The patient understands that monitoring is required including baseline LFTs and repeat LFTs at intervals. The patient understands that they are to contact us or the primary physician if concerning signs are noted. Erythromycin Pregnancy And Lactation Text: This medication is Pregnancy Category B and is considered safe during pregnancy. It is also excreted in breast milk. Solaraze Counseling:  I discussed with the patient the risks of Solaraze including but not limited to erythema, scaling, itching, weeping, crusting, and pain. Isotretinoin Counseling: Patient should get monthly blood tests, not donate blood, not drive at night if vision affected, not share medication, and not undergo elective surgery for 6 months after tx completed. Side effects reviewed, pt to contact office should one occur. Ivermectin Counseling:  Patient instructed to take medication on an empty stomach with a full glass of water. Patient informed of potential adverse effects including but not limited to nausea, diarrhea, dizziness, itching, and swelling of the extremities or lymph nodes. The patient verbalized understanding of the proper use and possible adverse effects of ivermectin. All of the patient's questions and concerns were addressed. Niacinamide Pregnancy And Lactation Text: These medications are considered safe during pregnancy. Metronidazole Counseling:  I discussed with the patient the risks of metronidazole including but not limited to seizures, nausea/vomiting, a metallic taste in the mouth, nausea/vomiting and severe allergy. Solaraze Pregnancy And Lactation Text: This medication is Pregnancy Category B and is considered safe. There is some data to suggest avoiding during the third trimester. It is unknown if this medication is excreted in breast milk. Arava Counseling:  Patient counseled regarding adverse effects of Arava including but not limited to nausea, vomiting, abnormalities in liver function tests. Patients may develop mouth sores, rash, diarrhea, and abnormalities in blood counts. The patient understands that monitoring is required including LFTs and blood counts. There is a rare possibility of scarring of the liver and lung problems that can occur when taking methotrexate. Persistent nausea, loss of appetite, pale stools, dark urine, cough, and shortness of breath should be reported immediately. Patient advised to discontinue Arava treatment and consult with a physician prior to attempting conception. The patient will have to undergo a treatment to eliminate Arava from the body prior to conception. Xolair Pregnancy And Lactation Text: This medication is Pregnancy Category B and is considered safe during pregnancy. This medication is excreted in breast milk. Eucrisa Counseling: Patient may experience a mild burning sensation during topical application. Carlos Lass is not approved in children less than 3years of age. Isotretinoin Pregnancy And Lactation Text: This medication is Pregnancy Category X and is considered extremely dangerous during pregnancy. It is unknown if it is excreted in breast milk. Valtrex Pregnancy And Lactation Text: this medication is Pregnancy Category B and is considered safe during pregnancy. This medication is not directly found in breast milk but it's metabolite acyclovir is present. Nsaids Counseling: NSAID Counseling: I discussed with the patient that NSAIDs should be taken with food. Prolonged use of NSAIDs can result in the development of stomach ulcers. Patient advised to stop taking NSAIDs if abdominal pain occurs. The patient verbalized understanding of the proper use and possible adverse effects of NSAIDs. All of the patient's questions and concerns were addressed. Metronidazole Pregnancy And Lactation Text: This medication is Pregnancy Category B and considered safe during pregnancy. It is also excreted in breast milk. Ketoconazole Counseling:   Patient counseled regarding improving absorption with orange juice. Adverse effects include but are not limited to breast enlargement, headache, diarrhea, nausea, upset stomach, liver function test abnormalities, taste disturbance, and stomach pain. There is a rare possibility of liver failure that can occur when taking ketoconazole. The patient understands that monitoring of LFTs may be required, especially at baseline. The patient verbalized understanding of the proper use and possible adverse effects of ketoconazole. All of the patient's questions and concerns were addressed. Rituxan Counseling:  I discussed with the patient the risks of Rituxan infusions. Side effects can include infusion reactions, severe drug rashes including mucocutaneous reactions, reactivation of latent hepatitis and other infections and rarely progressive multifocal leukoencephalopathy. All of the patient's questions and concerns were addressed. High Dose Vitamin A Counseling: Side effects reviewed, pt to contact office should one occur. Topical Retinoid counseling:  Patient advised to apply a pea-sized amount only at bedtime and wait 30 minutes after washing their face before applying. If too drying, patient may add a non-comedogenic moisturizer. The patient verbalized understanding of the proper use and possible adverse effects of retinoids. All of the patient's questions and concerns were addressed. Valtrex Counseling: I discussed with the patient the risks of valacyclovir including but not limited to kidney damage, nausea, vomiting and severe allergy. The patient understands that if the infection seems to be worsening or is not improving, they are to call.

## 2019-07-26 NOTE — ED BEHAVIORAL HEALTH ASSESSMENT NOTE - SUMMARY
Patient is a 69 year old, male; domiciled with wife of many years; with one adult daughter, retired , with long h/o Bipolar disorder; with multiple prior psychiatric hospitalizations (last one was at [facility] for one month, discharged two weeks ago), several of the hospitalizations for ollie but most recent for depression; no known suicide attempts; no known history of violence or arrests; no active substance abuse or known history of complicated withdrawal; with PMhx of DM, HLD, and hypothyroidism who walked in to ER accompanied by wife for evaluation of intolerable anxiety, depression, and worsening suicidal ideation that is becoming active.    Patient complains of worsening depressed mood, insomnia, poor appetite (20lb weight loss in past 2 months), low energy, anhedonia, guilt, severe anxiety, and worsening SI that began as passive but has become active. Patient reports intermittent thoughts of ending his life with no specific plan and ambivalent intent. Patient expresses worsening frustration that many medication changes have failed and inquires about ECT. He requests admission for safety and relief of his severe symptoms. He reports medications are no longer working and he is unable to function at home. Patient is considered high risk, is requesting voluntary inpatient hospitalization, and is appropriate for this level of care given acute risk of harm to self and numerous failed outpatient medication trials.

## 2019-07-26 NOTE — ED BEHAVIORAL HEALTH ASSESSMENT NOTE - HPI (INCLUDE ILLNESS QUALITY, SEVERITY, DURATION, TIMING, CONTEXT, MODIFYING FACTORS, ASSOCIATED SIGNS AND SYMPTOMS)
Patient is a 69 year old, male; domiciled with wife of many years; with one adult daughter, retired , with long h/o Bipolar disorder; with multiple prior psychiatric hospitalizations (last one was at [facility] for one month, discharged two weeks ago), several of the hospitalizations for ollie but most recent for depression; no known suicide attempts; no known history of violence or arrests; no active substance abuse or known history of complicated withdrawal; with PMhx of DM, HLD, and hypothyroidism who walked in to ER accompanied by wife for evaluation of intolerable anxiety, depression, and worsening suicidal ideation that is becoming active.      Patient reports that he had been stable for over 20 years until the holidays 2018 when he had a manic episodes during which he reports several weeks of increased energy, decreased need for sleep, more impulsive behavior, increase in cooking, following supplements and organic diets.  He had been taking medication consistently. He followed up with his psychiatrist Dr. Live who adjusted the medications and he fell into a depressive episode starting in March 2019. Patient then presented to [facility] ED in June 2019 reporting that for the last month he had been increasingly anxious and medication adjustments by psychiatrist did not work.  He began to experience suicidal thoughts at that time. Patient was admitted to [facility] for one month, had failed trial of Seroquel, and then was put on Lithium, without relief of symptoms. He was discharged but he feels this was too soon and that he was still very symptomatic. Over the past two weeks, patient complains of worsening depressed mood, insomnia, poor appetite (20lb weight loss in past 2 months), low energy, anhedonia, guilt, severe anxiety, and worsening SI that began as passive but has become active. Patient reports intermittent thoughts of ending his life with no specific plan and ambivalent intent. Reports no preparatory acts for suicide, no attempts, no self-injury. Patient reports his biggest psychosocial stressor as "anxiety about never getting better". Also reports stress about his wife leaving for vacation next week. Patient expresses worsening frustration that many medication changes have failed and inquires about ECT. He requests admission for safety and relief of his severe symptoms.    Patient denies any current manic symptoms including elevated mood, increased irritability, mood lability, distractibility, grandiosity, pressured speech, increase in goal-directed activity, or decreased need for sleep. Patient denies any current or prior psychotic symptoms including paranoia, ideas of reference, thought insertion/broadcasting, or auditory/visual/olfactory/tactile/gustatory hallucinations. Patient denies current or prior substance abuse. On ROS, patient endorses one month of "jerking" of the muscles in his arms.    Collateral obtained in person from patient's wife, Grace, who confirms HPI and relays that she is very concerned about the patient's worsening of symptoms and new-onset of active SI. She is concerned about his safety and advocates for admission.

## 2019-07-26 NOTE — ED BEHAVIORAL HEALTH ASSESSMENT NOTE - CURRENT MEDICATION
Psychiatric: Lithium 600mg AM and 300mg qhs, Zoloft 50mg daily, Gabapentin 600mg TID    Medical: Hydroxyzine 50 mg 3 x daily, [place] thyroid 90 mg daily, Atorvastatin 20 mg daily, Insulin lantus 10 units AM, 50 units qhs, Insulin humalog sliding sclae, Ocuvite Tablet BID, senna BID

## 2019-07-26 NOTE — ED BEHAVIORAL HEALTH ASSESSMENT NOTE - DESCRIPTION
DM, HLD, hypothyroidism Very anxious in the ED, but cooperative with interview.    Vital Signs Last 24 Hrs  T(C): 36.8 (26 Jul 2019 12:34), Max: 36.8 (26 Jul 2019 12:34)  T(F): 98.2 (26 Jul 2019 12:34), Max: 98.2 (26 Jul 2019 12:34)  HR: 70 (26 Jul 2019 12:34) (70 - 70)  BP: 125/72 (26 Jul 2019 12:34) (125/72 - 125/72)  BP(mean): --  RR: 16 (26 Jul 2019 12:34) (16 - 16)  SpO2: 100% (26 Jul 2019 12:34) (100% - 100%) Patient was raised by parents. He was one of 4 children. Currently  with one daughter. Retired from work as a

## 2019-07-26 NOTE — ED BEHAVIORAL HEALTH ASSESSMENT NOTE - OTHER PAST PSYCHIATRIC HISTORY (INCLUDE DETAILS REGARDING ONSET, COURSE OF ILLNESS, INPATIENT/OUTPATIENT TREATMENT)
Patient has long psychiatric history. Patient was first admitted to hospital in 1967 while in hospital for first severe depressive episode.  Patient reports he was treated with ECT.  He reports he has been hospitalized a total of around 6 times and last time was [facility] two weeks ago.  Most of other episodes were related to ollie. He denies any h/o prior suicide attempts. Currently he is followed by Dr. Live.

## 2019-07-26 NOTE — ED ADULT NURSE NOTE - NSIMPLEMENTINTERV_GEN_ALL_ED
Implemented All Universal Safety Interventions:  [place] to call system. Call bell, personal items and telephone within reach. Instruct patient to call for assistance. Room bathroom lighting operational. Non-slip footwear when patient is off stretcher. Physically safe environment: no spills, clutter or unnecessary equipment. Stretcher in lowest position, wheels locked, appropriate side rails in place.

## 2019-07-26 NOTE — ED BEHAVIORAL HEALTH NOTE - BEHAVIORAL HEALTH NOTE
Writer contacted pt's insurance at [phone] and spoke with Keshia LUCERO who provided 5 day auth #[id number]. Concurrent review due on 7/30 during normal business hours; reviewer not yet assigned.

## 2019-07-26 NOTE — ED ADULT NURSE REASSESSMENT NOTE - NS ED NURSE REASSESS COMMENT FT1
Patient presents calm and cooperative, NAD, blood work and EKG done, pt currently in BH room 3 awaiting further MD evaluation, will continue to monitor pt.

## 2019-07-27 LAB
GLUCOSE BLDC GLUCOMTR-MCNC: 155 MG/DL — HIGH (ref 70–99)
GLUCOSE BLDC GLUCOMTR-MCNC: 157 MG/DL — HIGH (ref 70–99)
GLUCOSE BLDC GLUCOMTR-MCNC: 187 MG/DL — HIGH (ref 70–99)
GLUCOSE BLDC GLUCOMTR-MCNC: 284 MG/DL — HIGH (ref 70–99)
HBA1C BLD-MCNC: 8.3 % — HIGH (ref 4–5.6)

## 2019-07-27 PROCEDURE — 99222 1ST HOSP IP/OBS MODERATE 55: CPT

## 2019-07-27 RX ORDER — ACETAMINOPHEN 500 MG
650 TABLET ORAL ONCE
Refills: 0 | Status: COMPLETED | OUTPATIENT
Start: 2019-07-27 | End: 2019-07-27

## 2019-07-27 RX ADMIN — Medication 650 MILLIGRAM(S): at 03:59

## 2019-07-27 RX ADMIN — Medication 650 MILLIGRAM(S): at 03:35

## 2019-07-27 RX ADMIN — SENNA PLUS 1 TABLET(S): 8.6 TABLET ORAL at 20:50

## 2019-07-27 RX ADMIN — INSULIN GLARGINE 10 UNIT(S): 100 INJECTION, SOLUTION SUBCUTANEOUS at 08:54

## 2019-07-27 RX ADMIN — GABAPENTIN 600 MILLIGRAM(S): 400 CAPSULE ORAL at 12:51

## 2019-07-27 RX ADMIN — GABAPENTIN 600 MILLIGRAM(S): 400 CAPSULE ORAL at 20:50

## 2019-07-27 RX ADMIN — Medication 1 MILLIGRAM(S): at 18:50

## 2019-07-27 RX ADMIN — SERTRALINE 50 MILLIGRAM(S): 25 TABLET, FILM COATED ORAL at 09:13

## 2019-07-27 RX ADMIN — LITHIUM CARBONATE 300 MILLIGRAM(S): 300 TABLET, EXTENDED RELEASE ORAL at 20:50

## 2019-07-27 RX ADMIN — Medication 2: at 16:45

## 2019-07-27 RX ADMIN — Medication 150 MICROGRAM(S): at 07:39

## 2019-07-27 RX ADMIN — GABAPENTIN 600 MILLIGRAM(S): 400 CAPSULE ORAL at 09:13

## 2019-07-27 RX ADMIN — SENNA PLUS 1 TABLET(S): 8.6 TABLET ORAL at 09:24

## 2019-07-27 RX ADMIN — Medication 1 MILLIGRAM(S): at 06:47

## 2019-07-27 RX ADMIN — Medication 5 MILLIGRAM(S): at 23:09

## 2019-07-27 RX ADMIN — INSULIN GLARGINE 50 UNIT(S): 100 INJECTION, SOLUTION SUBCUTANEOUS at 21:38

## 2019-07-27 RX ADMIN — Medication 1 TABLET(S): at 09:13

## 2019-07-27 RX ADMIN — Medication 6: at 12:07

## 2019-07-27 RX ADMIN — LITHIUM CARBONATE 600 MILLIGRAM(S): 300 TABLET, EXTENDED RELEASE ORAL at 09:13

## 2019-07-27 RX ADMIN — Medication 2: at 08:54

## 2019-07-27 RX ADMIN — ATORVASTATIN CALCIUM 20 MILLIGRAM(S): 80 TABLET, FILM COATED ORAL at 20:50

## 2019-07-28 LAB
GLUCOSE BLDC GLUCOMTR-MCNC: 142 MG/DL — HIGH (ref 70–99)
GLUCOSE BLDC GLUCOMTR-MCNC: 189 MG/DL — HIGH (ref 70–99)
GLUCOSE BLDC GLUCOMTR-MCNC: 189 MG/DL — HIGH (ref 70–99)
GLUCOSE BLDC GLUCOMTR-MCNC: 207 MG/DL — HIGH (ref 70–99)

## 2019-07-28 PROCEDURE — 99232 SBSQ HOSP IP/OBS MODERATE 35: CPT

## 2019-07-28 RX ORDER — GABAPENTIN 400 MG/1
400 CAPSULE ORAL THREE TIMES A DAY
Refills: 0 | Status: DISCONTINUED | OUTPATIENT
Start: 2019-07-28 | End: 2019-07-30

## 2019-07-28 RX ORDER — LITHIUM CARBONATE 300 MG/1
300 TABLET, EXTENDED RELEASE ORAL
Refills: 0 | Status: DISCONTINUED | OUTPATIENT
Start: 2019-07-28 | End: 2019-07-29

## 2019-07-28 RX ORDER — LANOLIN ALCOHOL/MO/W.PET/CERES
3 CREAM (GRAM) TOPICAL ONCE
Refills: 0 | Status: COMPLETED | OUTPATIENT
Start: 2019-07-28 | End: 2019-07-28

## 2019-07-28 RX ORDER — SERTRALINE 25 MG/1
25 TABLET, FILM COATED ORAL DAILY
Refills: 0 | Status: DISCONTINUED | OUTPATIENT
Start: 2019-07-28 | End: 2019-07-29

## 2019-07-28 RX ADMIN — SENNA PLUS 1 TABLET(S): 8.6 TABLET ORAL at 21:09

## 2019-07-28 RX ADMIN — Medication 650 MILLIGRAM(S): at 02:06

## 2019-07-28 RX ADMIN — LITHIUM CARBONATE 600 MILLIGRAM(S): 300 TABLET, EXTENDED RELEASE ORAL at 08:43

## 2019-07-28 RX ADMIN — Medication 4: at 17:30

## 2019-07-28 RX ADMIN — SENNA PLUS 1 TABLET(S): 8.6 TABLET ORAL at 08:43

## 2019-07-28 RX ADMIN — LITHIUM CARBONATE 300 MILLIGRAM(S): 300 TABLET, EXTENDED RELEASE ORAL at 21:09

## 2019-07-28 RX ADMIN — GABAPENTIN 600 MILLIGRAM(S): 400 CAPSULE ORAL at 08:44

## 2019-07-28 RX ADMIN — INSULIN GLARGINE 10 UNIT(S): 100 INJECTION, SOLUTION SUBCUTANEOUS at 08:44

## 2019-07-28 RX ADMIN — Medication 1 TABLET(S): at 08:43

## 2019-07-28 RX ADMIN — Medication 3 MILLIGRAM(S): at 01:19

## 2019-07-28 RX ADMIN — Medication 1 MILLIGRAM(S): at 15:29

## 2019-07-28 RX ADMIN — Medication 1 MILLIGRAM(S): at 22:51

## 2019-07-28 RX ADMIN — Medication 2: at 12:54

## 2019-07-28 RX ADMIN — GABAPENTIN 400 MILLIGRAM(S): 400 CAPSULE ORAL at 12:54

## 2019-07-28 RX ADMIN — Medication 5 MILLIGRAM(S): at 22:49

## 2019-07-28 RX ADMIN — Medication 650 MILLIGRAM(S): at 01:13

## 2019-07-28 RX ADMIN — Medication 150 MICROGRAM(S): at 08:44

## 2019-07-28 RX ADMIN — SERTRALINE 50 MILLIGRAM(S): 25 TABLET, FILM COATED ORAL at 08:43

## 2019-07-28 RX ADMIN — GABAPENTIN 400 MILLIGRAM(S): 400 CAPSULE ORAL at 21:09

## 2019-07-28 RX ADMIN — INSULIN GLARGINE 50 UNIT(S): 100 INJECTION, SOLUTION SUBCUTANEOUS at 21:10

## 2019-07-28 RX ADMIN — ATORVASTATIN CALCIUM 20 MILLIGRAM(S): 80 TABLET, FILM COATED ORAL at 21:09

## 2019-07-29 LAB
GLUCOSE BLDC GLUCOMTR-MCNC: 153 MG/DL — HIGH (ref 70–99)
GLUCOSE BLDC GLUCOMTR-MCNC: 156 MG/DL — HIGH (ref 70–99)
GLUCOSE BLDC GLUCOMTR-MCNC: 236 MG/DL — HIGH (ref 70–99)
GLUCOSE BLDC GLUCOMTR-MCNC: 267 MG/DL — HIGH (ref 70–99)

## 2019-07-29 PROCEDURE — 99223 1ST HOSP IP/OBS HIGH 75: CPT

## 2019-07-29 PROCEDURE — 99232 SBSQ HOSP IP/OBS MODERATE 35: CPT

## 2019-07-29 RX ORDER — LITHIUM CARBONATE 300 MG/1
300 TABLET, EXTENDED RELEASE ORAL AT BEDTIME
Refills: 0 | Status: DISCONTINUED | OUTPATIENT
Start: 2019-07-29 | End: 2019-07-30

## 2019-07-29 RX ORDER — INSULIN LISPRO 100/ML
VIAL (ML) SUBCUTANEOUS AT BEDTIME
Refills: 0 | Status: DISCONTINUED | OUTPATIENT
Start: 2019-07-29 | End: 2019-08-30

## 2019-07-29 RX ORDER — QUETIAPINE FUMARATE 200 MG/1
25 TABLET, FILM COATED ORAL ONCE
Refills: 0 | Status: COMPLETED | OUTPATIENT
Start: 2019-07-29 | End: 2019-07-29

## 2019-07-29 RX ADMIN — Medication 1 TABLET(S): at 09:08

## 2019-07-29 RX ADMIN — QUETIAPINE FUMARATE 25 MILLIGRAM(S): 200 TABLET, FILM COATED ORAL at 22:28

## 2019-07-29 RX ADMIN — GABAPENTIN 400 MILLIGRAM(S): 400 CAPSULE ORAL at 21:05

## 2019-07-29 RX ADMIN — Medication 2: at 08:55

## 2019-07-29 RX ADMIN — INSULIN GLARGINE 50 UNIT(S): 100 INJECTION, SOLUTION SUBCUTANEOUS at 21:30

## 2019-07-29 RX ADMIN — Medication 1: at 21:30

## 2019-07-29 RX ADMIN — Medication 4: at 12:25

## 2019-07-29 RX ADMIN — Medication 1 MILLIGRAM(S): at 18:23

## 2019-07-29 RX ADMIN — SENNA PLUS 1 TABLET(S): 8.6 TABLET ORAL at 21:05

## 2019-07-29 RX ADMIN — LITHIUM CARBONATE 300 MILLIGRAM(S): 300 TABLET, EXTENDED RELEASE ORAL at 21:05

## 2019-07-29 RX ADMIN — ATORVASTATIN CALCIUM 20 MILLIGRAM(S): 80 TABLET, FILM COATED ORAL at 21:05

## 2019-07-29 RX ADMIN — INSULIN GLARGINE 10 UNIT(S): 100 INJECTION, SOLUTION SUBCUTANEOUS at 08:54

## 2019-07-29 RX ADMIN — Medication 150 MICROGRAM(S): at 07:27

## 2019-07-29 RX ADMIN — GABAPENTIN 400 MILLIGRAM(S): 400 CAPSULE ORAL at 12:34

## 2019-07-29 RX ADMIN — SENNA PLUS 1 TABLET(S): 8.6 TABLET ORAL at 09:08

## 2019-07-29 RX ADMIN — Medication 2: at 17:24

## 2019-07-29 RX ADMIN — GABAPENTIN 400 MILLIGRAM(S): 400 CAPSULE ORAL at 09:07

## 2019-07-29 NOTE — CONSULT NOTE ADULT - SUBJECTIVE AND OBJECTIVE BOX
HPI:  68 yo M with bipolar disorder, macular degeneration of L eye, FLORECITA noncompliant with CPAP, DM2 on insulin with mild neuropathy presenting [facility] for worsening anxiety/depression.  Patient is not that cooperative with interview, was found in common area playing cards and was hard to engage to come discuss his medical care.   Reports has had DM2 x 30+ years, reports on insulin for "few years".  Cannot clarify his home insulin doses stating the 50 units and 10 units were started s/p a recent hospital stay.  Denies taking Metformin.    Denies any history of stroke or myocardial infarction, no heart history.  Reports walks unassisted and states doesn't walk much but is not limited by CP or SOB.  States can climb 10 steps in home without symptoms.  Denies CP, SOB, leg swelling.  Never smoker.  Denies trouble swallowing, h/o PNA or choking episodes.  No spinal surgeries or known aneurysm.   Lives with family.   Reports ECT when young in 1967 and 1972, denies untoward reactions, "I guess it worked".  No prior anesthesia complications.  No dentures or loose teeth.  Reports feeling anxious at the thought of ECT    Allergies  Celebrex (Rash)  Loxitane (Dystonic RXN)    MEDICATIONS  (STANDING):  atorvastatin 20 milliGRAM(s) Oral at bedtime  gabapentin 400 milliGRAM(s) Oral three times a day  insulin glargine Injectable (LANTUS) 10 Unit(s) SubCutaneous every morning  insulin glargine Injectable (LANTUS) 50 Unit(s) SubCutaneous at bedtime  insulin lispro (HumaLOG) corrective regimen sliding scale   SubCutaneous three times a day before meals  insulin lispro (HumaLOG) corrective regimen sliding scale   SubCutaneous at bedtime  levothyroxine 150 MICROGram(s) Oral daily  lithium 300 milliGRAM(s) Oral at bedtime  multivitamin 1 Tablet(s) Oral daily  senna 1 Tablet(s) Oral two times a day    MEDICATIONS  (PRN):  dextrose 40% Gel 15 Gram(s) Oral once PRN Blood Glucose LESS THAN 70 milliGRAM(s)/deciliter  glucagon  Injectable 1 milliGRAM(s) IntraMuscular once PRN Glucose LESS THAN 70 milligrams/deciliter  LORazepam     Tablet 1 milliGRAM(s) Oral every 6 hours PRN anxiety/agitation  LORazepam   Injectable 1 milliGRAM(s) IntraMuscular once PRN agitation  melatonin. 5 milliGRAM(s) Oral at bedtime PRN Insomnia    PAST MEDICAL:   High cholesterol  Hypothyroid  Diabetes  Pancreatitis  Bipolar 1 disorder    SURGICAL HISTORY:  No significant past surgical history    FAMILY HISTORY:  Denies any h/o MI, DM2  States on mothers side ,her brother and sister have psychiatric illness     Social History:   Denies smoking, drinking or drug use     Review of Systems:   CONSTITUTIONAL: No fever, weight loss, or fatigue  EYES: No eye pain, visual disturbances, or discharge  ENMT:  No difficulty hearing, tinnitus, vertigo; No sinus or throat pain  NECK: No pain or stiffness  RESPIRATORY: No cough, wheezing, chills or hemoptysis; No shortness of breath  CARDIOVASCULAR: No chest pain, palpitations, dizziness, or leg swelling  GASTROINTESTINAL: No abdominal or epigastric pain. No nausea, vomiting, or hematemesis; No diarrhea or constipation. No melena or hematochezia.  GENITOURINARY: No dysuria, frequency, hematuria, or incontinence  NEUROLOGICAL: No headaches, memory loss, loss of strength, numbness, or tremors  SKIN: No itching, burning, rashes, or lesions   LYMPH NODES: No enlarged glands  ENDOCRINE: No heat or cold intolerance; No hair loss  MUSCULOSKELETAL: No joint pain or swelling; No muscle, back, or extremity pain  HEME/LYMPH: No easy bruising, or bleeding gums  ALLERGY AND IMMUNOLOGIC: No hives or eczema    T(C): 36.8 (07-29-19 @ 08:07), Max: 37.8 (07-28-19 @ 15:38)  HR: 70  BP: 126/60  RR: 18 (07-29-19 @ 08:07) (18 - 18)    CAPILLARY BLOOD GLUCOSE  POCT Blood Glucose.: 236 mg/dL (29 Jul 2019 12:05)  POCT Blood Glucose.: 156 mg/dL (29 Jul 2019 08:12)  POCT Blood Glucose.: 189 mg/dL (28 Jul 2019 20:58)  POCT Blood Glucose.: 207 mg/dL (28 Jul 2019 16:36)    PHYSICAL EXAM:  GENERAL: NAD, overweight  HEENT:  Atraumatic, Normocephalic  NECK: Supple, No JVD  CHEST/LUNG: Clear to auscultation bilaterally; no wheeze  HEART: Regular rate and rhythm; no murmurs, rubs, or gallops  ABDOMEN: Soft, Nontender, Nondistended; Bowel sounds present  EXTREMITIES:  warm and well perfused, no clubbing, cyanosis, or edema  PSYCH: AAOx3  NEUROLOGY: non-focal  SKIN: mild bruising on R hand from prior blood draw     LABS:    10.71>14.9/45.1<165    139/4.5/106/23/16/0.88<216    TSH 1.97  HbA1c: 8.3%    EKG: NSR, no ST changes     Care Discussed with Providers:  Dr. Stockton HPI:  70 yo M with bipolar disorder, macular degeneration of L eye, FLORECITA noncompliant with CPAP, hypothyroidism, DM2 on insulin with mild neuropathy presenting [facility] for worsening anxiety/depression.  Patient is not that cooperative with interview, was found in common area playing cards and was hard to engage to come discuss his medical care.   Reports has had DM2 x 30+ years, reports on insulin for "few years".  Cannot clarify his home insulin doses stating the 50 units and 10 units were started s/p a recent hospital stay.  Denies taking Metformin.    Denies any history of stroke or myocardial infarction, no heart history.  Reports walks unassisted and states doesn't walk much but is not limited by CP or SOB.  States can climb 10 steps in home without symptoms.  Denies CP, SOB, leg swelling.  Never smoker.  Denies trouble swallowing, h/o PNA or choking episodes.  No spinal surgeries or known aneurysm.   Lives with family.   Reports ECT when young in 1967 and 1972, denies untoward reactions, "I guess it worked".  No prior anesthesia complications.  No dentures or loose teeth.  Reports feeling anxious at the thought of ECT    Allergies  Celebrex (Rash)  Loxitane (Dystonic RXN)    MEDICATIONS  (STANDING):  atorvastatin 20 milliGRAM(s) Oral at bedtime  gabapentin 400 milliGRAM(s) Oral three times a day  insulin glargine Injectable (LANTUS) 10 Unit(s) SubCutaneous every morning  insulin glargine Injectable (LANTUS) 50 Unit(s) SubCutaneous at bedtime  insulin lispro (HumaLOG) corrective regimen sliding scale   SubCutaneous three times a day before meals  insulin lispro (HumaLOG) corrective regimen sliding scale   SubCutaneous at bedtime  levothyroxine 150 MICROGram(s) Oral daily  lithium 300 milliGRAM(s) Oral at bedtime  multivitamin 1 Tablet(s) Oral daily  senna 1 Tablet(s) Oral two times a day    MEDICATIONS  (PRN):  dextrose 40% Gel 15 Gram(s) Oral once PRN Blood Glucose LESS THAN 70 milliGRAM(s)/deciliter  glucagon  Injectable 1 milliGRAM(s) IntraMuscular once PRN Glucose LESS THAN 70 milligrams/deciliter  LORazepam     Tablet 1 milliGRAM(s) Oral every 6 hours PRN anxiety/agitation  LORazepam   Injectable 1 milliGRAM(s) IntraMuscular once PRN agitation  melatonin. 5 milliGRAM(s) Oral at bedtime PRN Insomnia    PAST MEDICAL:   High cholesterol  Hypothyroid  Diabetes  Pancreatitis  Bipolar 1 disorder    SURGICAL HISTORY:  No significant past surgical history    FAMILY HISTORY:  Denies any h/o MI, DM2  States on mothers side ,her brother and sister have psychiatric illness     Social History:   Denies smoking, drinking or drug use     Review of Systems:   CONSTITUTIONAL: No fever, weight loss, or fatigue  EYES: No eye pain, visual disturbances, or discharge  ENMT:  No difficulty hearing, tinnitus, vertigo; No sinus or throat pain  NECK: No pain or stiffness  RESPIRATORY: No cough, wheezing, chills or hemoptysis; No shortness of breath  CARDIOVASCULAR: No chest pain, palpitations, dizziness, or leg swelling  GASTROINTESTINAL: No abdominal or epigastric pain. No nausea, vomiting, or hematemesis; No diarrhea or constipation. No melena or hematochezia.  GENITOURINARY: No dysuria, frequency, hematuria, or incontinence  NEUROLOGICAL: No headaches, memory loss, loss of strength, numbness, or tremors  SKIN: No itching, burning, rashes, or lesions   LYMPH NODES: No enlarged glands  ENDOCRINE: No heat or cold intolerance; No hair loss  MUSCULOSKELETAL: No joint pain or swelling; No muscle, back, or extremity pain  HEME/LYMPH: No easy bruising, or bleeding gums  ALLERGY AND IMMUNOLOGIC: No hives or eczema    T(C): 36.8 (07-29-19 @ 08:07), Max: 37.8 (07-28-19 @ 15:38)  HR: 70  BP: 126/60  RR: 18 (07-29-19 @ 08:07) (18 - 18)    CAPILLARY BLOOD GLUCOSE  POCT Blood Glucose.: 236 mg/dL (29 Jul 2019 12:05)  POCT Blood Glucose.: 156 mg/dL (29 Jul 2019 08:12)  POCT Blood Glucose.: 189 mg/dL (28 Jul 2019 20:58)  POCT Blood Glucose.: 207 mg/dL (28 Jul 2019 16:36)    PHYSICAL EXAM:  GENERAL: NAD, overweight  HEENT:  Atraumatic, Normocephalic  NECK: Supple, No JVD  CHEST/LUNG: Clear to auscultation bilaterally; no wheeze  HEART: Regular rate and rhythm; no murmurs, rubs, or gallops  ABDOMEN: Soft, Nontender, Nondistended; Bowel sounds present  EXTREMITIES:  warm and well perfused, no clubbing, cyanosis, or edema  PSYCH: AAOx3  NEUROLOGY: non-focal  SKIN: mild bruising on R hand from prior blood draw     LABS:    10.71>14.9/45.1<165    139/4.5/106/23/16/0.88<216    TSH 1.97  HbA1c: 8.3%    EKG: NSR, no ST changes     Care Discussed with Providers:  Dr. Stockton

## 2019-07-30 LAB
GLUCOSE BLDC GLUCOMTR-MCNC: 122 MG/DL — HIGH (ref 70–99)
GLUCOSE BLDC GLUCOMTR-MCNC: 172 MG/DL — HIGH (ref 70–99)
GLUCOSE BLDC GLUCOMTR-MCNC: 179 MG/DL — HIGH (ref 70–99)
GLUCOSE BLDC GLUCOMTR-MCNC: 231 MG/DL — HIGH (ref 70–99)

## 2019-07-30 PROCEDURE — 71045 X-RAY EXAM CHEST 1 VIEW: CPT | Mod: 26

## 2019-07-30 PROCEDURE — 99232 SBSQ HOSP IP/OBS MODERATE 35: CPT | Mod: GC

## 2019-07-30 RX ORDER — GABAPENTIN 400 MG/1
200 CAPSULE ORAL THREE TIMES A DAY
Refills: 0 | Status: DISCONTINUED | OUTPATIENT
Start: 2019-07-30 | End: 2019-07-30

## 2019-07-30 RX ORDER — GABAPENTIN 400 MG/1
300 CAPSULE ORAL THREE TIMES A DAY
Refills: 0 | Status: DISCONTINUED | OUTPATIENT
Start: 2019-07-30 | End: 2019-07-30

## 2019-07-30 RX ADMIN — ATORVASTATIN CALCIUM 20 MILLIGRAM(S): 80 TABLET, FILM COATED ORAL at 21:02

## 2019-07-30 RX ADMIN — GABAPENTIN 200 MILLIGRAM(S): 400 CAPSULE ORAL at 13:31

## 2019-07-30 RX ADMIN — Medication 5 MILLIGRAM(S): at 21:02

## 2019-07-30 RX ADMIN — INSULIN GLARGINE 10 UNIT(S): 100 INJECTION, SOLUTION SUBCUTANEOUS at 08:33

## 2019-07-30 RX ADMIN — Medication 150 MICROGRAM(S): at 09:34

## 2019-07-30 RX ADMIN — Medication 1 TABLET(S): at 09:34

## 2019-07-30 RX ADMIN — SENNA PLUS 1 TABLET(S): 8.6 TABLET ORAL at 21:02

## 2019-07-30 RX ADMIN — SENNA PLUS 1 TABLET(S): 8.6 TABLET ORAL at 09:34

## 2019-07-30 RX ADMIN — Medication 0: at 21:28

## 2019-07-30 RX ADMIN — GABAPENTIN 300 MILLIGRAM(S): 400 CAPSULE ORAL at 09:33

## 2019-07-30 RX ADMIN — Medication 1 MILLIGRAM(S): at 13:25

## 2019-07-30 RX ADMIN — Medication 4: at 13:00

## 2019-07-30 RX ADMIN — Medication 2: at 17:56

## 2019-07-30 RX ADMIN — Medication 1 MILLIGRAM(S): at 21:02

## 2019-07-30 RX ADMIN — INSULIN GLARGINE 50 UNIT(S): 100 INJECTION, SOLUTION SUBCUTANEOUS at 21:28

## 2019-07-31 LAB
GLUCOSE BLDC GLUCOMTR-MCNC: 140 MG/DL — HIGH (ref 70–99)
GLUCOSE BLDC GLUCOMTR-MCNC: 209 MG/DL — HIGH (ref 70–99)
GLUCOSE BLDC GLUCOMTR-MCNC: 249 MG/DL — HIGH (ref 70–99)
GLUCOSE BLDC GLUCOMTR-MCNC: 297 MG/DL — HIGH (ref 70–99)

## 2019-07-31 PROCEDURE — 90870 ELECTROCONVULSIVE THERAPY: CPT

## 2019-07-31 PROCEDURE — 99232 SBSQ HOSP IP/OBS MODERATE 35: CPT | Mod: GC,25

## 2019-07-31 RX ORDER — ACETAMINOPHEN 500 MG
650 TABLET ORAL EVERY 6 HOURS
Refills: 0 | Status: DISCONTINUED | OUTPATIENT
Start: 2019-07-31 | End: 2019-08-30

## 2019-07-31 RX ORDER — MIDAZOLAM HYDROCHLORIDE 1 MG/ML
2 INJECTION, SOLUTION INTRAMUSCULAR; INTRAVENOUS ONCE
Refills: 0 | Status: DISCONTINUED | OUTPATIENT
Start: 2019-07-31 | End: 2019-07-31

## 2019-07-31 RX ORDER — BENZOCAINE AND MENTHOL 5; 1 G/100ML; G/100ML
1 LIQUID ORAL THREE TIMES A DAY
Refills: 0 | Status: DISCONTINUED | OUTPATIENT
Start: 2019-07-31 | End: 2019-08-30

## 2019-07-31 RX ORDER — DOCUSATE SODIUM 100 MG
100 CAPSULE ORAL
Refills: 0 | Status: DISCONTINUED | OUTPATIENT
Start: 2019-07-31 | End: 2019-08-30

## 2019-07-31 RX ADMIN — Medication 650 MILLIGRAM(S): at 19:49

## 2019-07-31 RX ADMIN — Medication 4: at 17:00

## 2019-07-31 RX ADMIN — Medication 100 MILLIGRAM(S): at 21:44

## 2019-07-31 RX ADMIN — Medication 650 MILLIGRAM(S): at 20:30

## 2019-07-31 RX ADMIN — MIDAZOLAM HYDROCHLORIDE 2 MILLIGRAM(S): 1 INJECTION, SOLUTION INTRAMUSCULAR; INTRAVENOUS at 09:25

## 2019-07-31 RX ADMIN — Medication 650 MILLIGRAM(S): at 13:24

## 2019-07-31 RX ADMIN — SENNA PLUS 1 TABLET(S): 8.6 TABLET ORAL at 21:44

## 2019-07-31 RX ADMIN — INSULIN GLARGINE 50 UNIT(S): 100 INJECTION, SOLUTION SUBCUTANEOUS at 21:11

## 2019-07-31 RX ADMIN — Medication 5 MILLIGRAM(S): at 21:30

## 2019-07-31 RX ADMIN — Medication 0: at 21:05

## 2019-07-31 RX ADMIN — ATORVASTATIN CALCIUM 20 MILLIGRAM(S): 80 TABLET, FILM COATED ORAL at 21:44

## 2019-07-31 RX ADMIN — Medication 650 MILLIGRAM(S): at 11:58

## 2019-07-31 RX ADMIN — Medication 1 MILLIGRAM(S): at 20:00

## 2019-07-31 RX ADMIN — BENZOCAINE AND MENTHOL 1 LOZENGE: 5; 1 LIQUID ORAL at 20:04

## 2019-08-01 LAB
GLUCOSE BLDC GLUCOMTR-MCNC: 160 MG/DL — HIGH (ref 70–99)
GLUCOSE BLDC GLUCOMTR-MCNC: 209 MG/DL — HIGH (ref 70–99)
GLUCOSE BLDC GLUCOMTR-MCNC: 263 MG/DL — HIGH (ref 70–99)
GLUCOSE BLDC GLUCOMTR-MCNC: 276 MG/DL — HIGH (ref 70–99)

## 2019-08-01 PROCEDURE — 99232 SBSQ HOSP IP/OBS MODERATE 35: CPT | Mod: GC

## 2019-08-01 RX ORDER — IBUPROFEN 200 MG
400 TABLET ORAL EVERY 6 HOURS
Refills: 0 | Status: DISCONTINUED | OUTPATIENT
Start: 2019-08-01 | End: 2019-08-26

## 2019-08-01 RX ADMIN — Medication 400 MILLIGRAM(S): at 12:04

## 2019-08-01 RX ADMIN — INSULIN GLARGINE 50 UNIT(S): 100 INJECTION, SOLUTION SUBCUTANEOUS at 21:23

## 2019-08-01 RX ADMIN — Medication 4: at 17:11

## 2019-08-01 RX ADMIN — Medication 650 MILLIGRAM(S): at 03:09

## 2019-08-01 RX ADMIN — Medication 100 MILLIGRAM(S): at 08:45

## 2019-08-01 RX ADMIN — Medication 150 MICROGRAM(S): at 08:46

## 2019-08-01 RX ADMIN — Medication 650 MILLIGRAM(S): at 03:45

## 2019-08-01 RX ADMIN — Medication 1: at 21:23

## 2019-08-01 RX ADMIN — Medication 5 MILLIGRAM(S): at 21:10

## 2019-08-01 RX ADMIN — Medication 1 TABLET(S): at 08:46

## 2019-08-01 RX ADMIN — Medication 1 MILLIGRAM(S): at 16:40

## 2019-08-01 RX ADMIN — SENNA PLUS 1 TABLET(S): 8.6 TABLET ORAL at 21:09

## 2019-08-01 RX ADMIN — ATORVASTATIN CALCIUM 20 MILLIGRAM(S): 80 TABLET, FILM COATED ORAL at 21:09

## 2019-08-01 RX ADMIN — Medication 6: at 12:30

## 2019-08-01 RX ADMIN — Medication 1 MILLIGRAM(S): at 03:09

## 2019-08-01 RX ADMIN — Medication 1 MILLIGRAM(S): at 21:09

## 2019-08-01 RX ADMIN — BENZOCAINE AND MENTHOL 1 LOZENGE: 5; 1 LIQUID ORAL at 01:22

## 2019-08-01 RX ADMIN — Medication 2: at 08:45

## 2019-08-01 RX ADMIN — Medication 100 MILLIGRAM(S): at 21:09

## 2019-08-01 RX ADMIN — BENZOCAINE AND MENTHOL 1 LOZENGE: 5; 1 LIQUID ORAL at 11:50

## 2019-08-01 RX ADMIN — BENZOCAINE AND MENTHOL 1 LOZENGE: 5; 1 LIQUID ORAL at 06:39

## 2019-08-01 RX ADMIN — Medication 400 MILLIGRAM(S): at 21:08

## 2019-08-01 RX ADMIN — INSULIN GLARGINE 10 UNIT(S): 100 INJECTION, SOLUTION SUBCUTANEOUS at 08:45

## 2019-08-01 RX ADMIN — SENNA PLUS 1 TABLET(S): 8.6 TABLET ORAL at 08:46

## 2019-08-01 RX ADMIN — Medication 400 MILLIGRAM(S): at 21:50

## 2019-08-01 RX ADMIN — Medication 400 MILLIGRAM(S): at 14:10

## 2019-08-02 LAB
GLUCOSE BLDC GLUCOMTR-MCNC: 136 MG/DL — HIGH (ref 70–99)
GLUCOSE BLDC GLUCOMTR-MCNC: 238 MG/DL — HIGH (ref 70–99)
GLUCOSE BLDC GLUCOMTR-MCNC: 267 MG/DL — HIGH (ref 70–99)
GLUCOSE BLDC GLUCOMTR-MCNC: 273 MG/DL — HIGH (ref 70–99)

## 2019-08-02 PROCEDURE — 99232 SBSQ HOSP IP/OBS MODERATE 35: CPT | Mod: GC,25

## 2019-08-02 RX ADMIN — Medication 100 MILLIGRAM(S): at 12:32

## 2019-08-02 RX ADMIN — Medication 1: at 21:30

## 2019-08-02 RX ADMIN — Medication 400 MILLIGRAM(S): at 12:33

## 2019-08-02 RX ADMIN — Medication 400 MILLIGRAM(S): at 14:38

## 2019-08-02 RX ADMIN — SENNA PLUS 1 TABLET(S): 8.6 TABLET ORAL at 12:33

## 2019-08-02 RX ADMIN — Medication 100 MILLIGRAM(S): at 21:29

## 2019-08-02 RX ADMIN — Medication 1 MILLIGRAM(S): at 12:33

## 2019-08-02 RX ADMIN — Medication 6: at 12:32

## 2019-08-02 RX ADMIN — Medication 150 MICROGRAM(S): at 12:33

## 2019-08-02 RX ADMIN — Medication 1 MILLIGRAM(S): at 21:30

## 2019-08-02 RX ADMIN — INSULIN GLARGINE 50 UNIT(S): 100 INJECTION, SOLUTION SUBCUTANEOUS at 21:29

## 2019-08-02 RX ADMIN — ATORVASTATIN CALCIUM 20 MILLIGRAM(S): 80 TABLET, FILM COATED ORAL at 21:29

## 2019-08-02 RX ADMIN — BENZOCAINE AND MENTHOL 1 LOZENGE: 5; 1 LIQUID ORAL at 21:50

## 2019-08-02 RX ADMIN — SENNA PLUS 1 TABLET(S): 8.6 TABLET ORAL at 21:30

## 2019-08-02 RX ADMIN — Medication 4: at 17:25

## 2019-08-02 RX ADMIN — Medication 400 MILLIGRAM(S): at 18:35

## 2019-08-02 RX ADMIN — Medication 400 MILLIGRAM(S): at 19:02

## 2019-08-02 RX ADMIN — Medication 1 TABLET(S): at 12:33

## 2019-08-03 LAB
GLUCOSE BLDC GLUCOMTR-MCNC: 133 MG/DL — HIGH (ref 70–99)
GLUCOSE BLDC GLUCOMTR-MCNC: 193 MG/DL — HIGH (ref 70–99)
GLUCOSE BLDC GLUCOMTR-MCNC: 234 MG/DL — HIGH (ref 70–99)
GLUCOSE BLDC GLUCOMTR-MCNC: 273 MG/DL — HIGH (ref 70–99)

## 2019-08-03 PROCEDURE — 99231 SBSQ HOSP IP/OBS SF/LOW 25: CPT

## 2019-08-03 RX ADMIN — Medication 1 MILLIGRAM(S): at 20:51

## 2019-08-03 RX ADMIN — Medication 1 MILLIGRAM(S): at 08:15

## 2019-08-03 RX ADMIN — Medication 150 MICROGRAM(S): at 08:15

## 2019-08-03 RX ADMIN — Medication 4: at 12:16

## 2019-08-03 RX ADMIN — INSULIN GLARGINE 50 UNIT(S): 100 INJECTION, SOLUTION SUBCUTANEOUS at 21:19

## 2019-08-03 RX ADMIN — Medication 5 MILLIGRAM(S): at 22:19

## 2019-08-03 RX ADMIN — Medication 1: at 21:19

## 2019-08-03 RX ADMIN — Medication 100 MILLIGRAM(S): at 08:16

## 2019-08-03 RX ADMIN — SENNA PLUS 1 TABLET(S): 8.6 TABLET ORAL at 08:15

## 2019-08-03 RX ADMIN — ATORVASTATIN CALCIUM 20 MILLIGRAM(S): 80 TABLET, FILM COATED ORAL at 20:51

## 2019-08-03 RX ADMIN — INSULIN GLARGINE 10 UNIT(S): 100 INJECTION, SOLUTION SUBCUTANEOUS at 09:09

## 2019-08-03 RX ADMIN — Medication 2: at 17:11

## 2019-08-03 RX ADMIN — Medication 1 MILLIGRAM(S): at 14:04

## 2019-08-03 RX ADMIN — Medication 1 TABLET(S): at 08:15

## 2019-08-03 RX ADMIN — BENZOCAINE AND MENTHOL 1 LOZENGE: 5; 1 LIQUID ORAL at 22:19

## 2019-08-03 RX ADMIN — Medication 100 MILLIGRAM(S): at 20:51

## 2019-08-04 LAB
GLUCOSE BLDC GLUCOMTR-MCNC: 143 MG/DL — HIGH (ref 70–99)
GLUCOSE BLDC GLUCOMTR-MCNC: 223 MG/DL — HIGH (ref 70–99)
GLUCOSE BLDC GLUCOMTR-MCNC: 305 MG/DL — HIGH (ref 70–99)
GLUCOSE BLDC GLUCOMTR-MCNC: 375 MG/DL — HIGH (ref 70–99)

## 2019-08-04 PROCEDURE — 99231 SBSQ HOSP IP/OBS SF/LOW 25: CPT

## 2019-08-04 RX ADMIN — ATORVASTATIN CALCIUM 20 MILLIGRAM(S): 80 TABLET, FILM COATED ORAL at 20:44

## 2019-08-04 RX ADMIN — Medication 1 MILLIGRAM(S): at 08:47

## 2019-08-04 RX ADMIN — Medication 1 MILLIGRAM(S): at 19:35

## 2019-08-04 RX ADMIN — SENNA PLUS 1 TABLET(S): 8.6 TABLET ORAL at 08:47

## 2019-08-04 RX ADMIN — Medication 3: at 21:33

## 2019-08-04 RX ADMIN — INSULIN GLARGINE 50 UNIT(S): 100 INJECTION, SOLUTION SUBCUTANEOUS at 21:33

## 2019-08-04 RX ADMIN — Medication 150 MICROGRAM(S): at 08:47

## 2019-08-04 RX ADMIN — Medication 1 TABLET(S): at 08:47

## 2019-08-04 RX ADMIN — Medication 100 MILLIGRAM(S): at 20:44

## 2019-08-04 RX ADMIN — Medication 8: at 12:35

## 2019-08-04 RX ADMIN — SENNA PLUS 1 TABLET(S): 8.6 TABLET ORAL at 20:44

## 2019-08-04 RX ADMIN — INSULIN GLARGINE 10 UNIT(S): 100 INJECTION, SOLUTION SUBCUTANEOUS at 08:43

## 2019-08-04 RX ADMIN — Medication 4: at 17:32

## 2019-08-04 RX ADMIN — Medication 100 MILLIGRAM(S): at 08:47

## 2019-08-05 LAB
GLUCOSE BLDC GLUCOMTR-MCNC: 153 MG/DL — HIGH (ref 70–99)
GLUCOSE BLDC GLUCOMTR-MCNC: 194 MG/DL — HIGH (ref 70–99)
GLUCOSE BLDC GLUCOMTR-MCNC: 217 MG/DL — HIGH (ref 70–99)
GLUCOSE BLDC GLUCOMTR-MCNC: 325 MG/DL — HIGH (ref 70–99)

## 2019-08-05 PROCEDURE — 99232 SBSQ HOSP IP/OBS MODERATE 35: CPT | Mod: GC,25

## 2019-08-05 PROCEDURE — 90870 ELECTROCONVULSIVE THERAPY: CPT

## 2019-08-05 PROCEDURE — 90853 GROUP PSYCHOTHERAPY: CPT

## 2019-08-05 RX ADMIN — Medication 1 MILLIGRAM(S): at 12:14

## 2019-08-05 RX ADMIN — Medication 0: at 21:14

## 2019-08-05 RX ADMIN — Medication 5 MILLIGRAM(S): at 22:13

## 2019-08-05 RX ADMIN — Medication 150 MICROGRAM(S): at 12:14

## 2019-08-05 RX ADMIN — Medication 8: at 12:14

## 2019-08-05 RX ADMIN — Medication 5 MILLIGRAM(S): at 00:05

## 2019-08-05 RX ADMIN — Medication 100 MILLIGRAM(S): at 12:13

## 2019-08-05 RX ADMIN — INSULIN GLARGINE 50 UNIT(S): 100 INJECTION, SOLUTION SUBCUTANEOUS at 21:33

## 2019-08-05 RX ADMIN — ATORVASTATIN CALCIUM 20 MILLIGRAM(S): 80 TABLET, FILM COATED ORAL at 20:21

## 2019-08-05 RX ADMIN — Medication 1 TABLET(S): at 12:14

## 2019-08-05 RX ADMIN — Medication 2: at 17:04

## 2019-08-05 RX ADMIN — Medication 100 MILLIGRAM(S): at 20:21

## 2019-08-05 RX ADMIN — Medication 1 MILLIGRAM(S): at 20:21

## 2019-08-05 RX ADMIN — SENNA PLUS 1 TABLET(S): 8.6 TABLET ORAL at 12:14

## 2019-08-05 RX ADMIN — SENNA PLUS 1 TABLET(S): 8.6 TABLET ORAL at 20:21

## 2019-08-06 LAB
GLUCOSE BLDC GLUCOMTR-MCNC: 153 MG/DL — HIGH (ref 70–99)
GLUCOSE BLDC GLUCOMTR-MCNC: 244 MG/DL — HIGH (ref 70–99)
GLUCOSE BLDC GLUCOMTR-MCNC: 256 MG/DL — HIGH (ref 70–99)
GLUCOSE BLDC GLUCOMTR-MCNC: 320 MG/DL — HIGH (ref 70–99)

## 2019-08-06 PROCEDURE — 99232 SBSQ HOSP IP/OBS MODERATE 35: CPT | Mod: GC

## 2019-08-06 RX ADMIN — ATORVASTATIN CALCIUM 20 MILLIGRAM(S): 80 TABLET, FILM COATED ORAL at 20:25

## 2019-08-06 RX ADMIN — Medication 2: at 21:37

## 2019-08-06 RX ADMIN — INSULIN GLARGINE 10 UNIT(S): 100 INJECTION, SOLUTION SUBCUTANEOUS at 08:30

## 2019-08-06 RX ADMIN — Medication 1 TABLET(S): at 08:05

## 2019-08-06 RX ADMIN — Medication 1 MILLIGRAM(S): at 08:05

## 2019-08-06 RX ADMIN — Medication 1 MILLIGRAM(S): at 15:12

## 2019-08-06 RX ADMIN — Medication 6: at 17:09

## 2019-08-06 RX ADMIN — Medication 1 MILLIGRAM(S): at 20:25

## 2019-08-06 RX ADMIN — SENNA PLUS 1 TABLET(S): 8.6 TABLET ORAL at 20:25

## 2019-08-06 RX ADMIN — Medication 2: at 08:30

## 2019-08-06 RX ADMIN — SENNA PLUS 1 TABLET(S): 8.6 TABLET ORAL at 08:05

## 2019-08-06 RX ADMIN — Medication 100 MILLIGRAM(S): at 20:25

## 2019-08-06 RX ADMIN — Medication 5 MILLIGRAM(S): at 22:18

## 2019-08-06 RX ADMIN — INSULIN GLARGINE 50 UNIT(S): 100 INJECTION, SOLUTION SUBCUTANEOUS at 21:37

## 2019-08-06 RX ADMIN — Medication 150 MICROGRAM(S): at 07:48

## 2019-08-06 RX ADMIN — Medication 100 MILLIGRAM(S): at 08:05

## 2019-08-07 LAB
GLUCOSE BLDC GLUCOMTR-MCNC: 133 MG/DL — HIGH (ref 70–99)
GLUCOSE BLDC GLUCOMTR-MCNC: 137 MG/DL — HIGH (ref 70–99)
GLUCOSE BLDC GLUCOMTR-MCNC: 196 MG/DL — HIGH (ref 70–99)
GLUCOSE BLDC GLUCOMTR-MCNC: 228 MG/DL — HIGH (ref 70–99)
GLUCOSE BLDC GLUCOMTR-MCNC: 265 MG/DL — HIGH (ref 70–99)

## 2019-08-07 PROCEDURE — 90853 GROUP PSYCHOTHERAPY: CPT

## 2019-08-07 PROCEDURE — 90870 ELECTROCONVULSIVE THERAPY: CPT

## 2019-08-07 PROCEDURE — 99232 SBSQ HOSP IP/OBS MODERATE 35: CPT | Mod: GC,25

## 2019-08-07 RX ADMIN — Medication 650 MILLIGRAM(S): at 09:40

## 2019-08-07 RX ADMIN — Medication 1 TABLET(S): at 10:48

## 2019-08-07 RX ADMIN — Medication 1 MILLIGRAM(S): at 21:09

## 2019-08-07 RX ADMIN — Medication 400 MILLIGRAM(S): at 20:34

## 2019-08-07 RX ADMIN — Medication 400 MILLIGRAM(S): at 18:44

## 2019-08-07 RX ADMIN — Medication 100 MILLIGRAM(S): at 21:09

## 2019-08-07 RX ADMIN — SENNA PLUS 1 TABLET(S): 8.6 TABLET ORAL at 21:09

## 2019-08-07 RX ADMIN — Medication 100 MILLIGRAM(S): at 10:47

## 2019-08-07 RX ADMIN — Medication 150 MICROGRAM(S): at 10:47

## 2019-08-07 RX ADMIN — ATORVASTATIN CALCIUM 20 MILLIGRAM(S): 80 TABLET, FILM COATED ORAL at 21:09

## 2019-08-07 RX ADMIN — SENNA PLUS 1 TABLET(S): 8.6 TABLET ORAL at 10:48

## 2019-08-07 RX ADMIN — Medication 1 MILLIGRAM(S): at 10:47

## 2019-08-07 RX ADMIN — Medication 6: at 12:24

## 2019-08-07 RX ADMIN — Medication 650 MILLIGRAM(S): at 10:30

## 2019-08-07 RX ADMIN — Medication 5 MILLIGRAM(S): at 22:27

## 2019-08-07 RX ADMIN — INSULIN GLARGINE 50 UNIT(S): 100 INJECTION, SOLUTION SUBCUTANEOUS at 21:10

## 2019-08-08 LAB
GLUCOSE BLDC GLUCOMTR-MCNC: 135 MG/DL — HIGH (ref 70–99)
GLUCOSE BLDC GLUCOMTR-MCNC: 193 MG/DL — HIGH (ref 70–99)
GLUCOSE BLDC GLUCOMTR-MCNC: 243 MG/DL — HIGH (ref 70–99)
GLUCOSE BLDC GLUCOMTR-MCNC: 264 MG/DL — HIGH (ref 70–99)

## 2019-08-08 PROCEDURE — 99232 SBSQ HOSP IP/OBS MODERATE 35: CPT | Mod: GC

## 2019-08-08 RX ORDER — DIPHENHYDRAMINE HCL 50 MG
25 CAPSULE ORAL ONCE
Refills: 0 | Status: DISCONTINUED | OUTPATIENT
Start: 2019-08-08 | End: 2019-08-08

## 2019-08-08 RX ADMIN — INSULIN GLARGINE 50 UNIT(S): 100 INJECTION, SOLUTION SUBCUTANEOUS at 21:10

## 2019-08-08 RX ADMIN — Medication 4: at 12:35

## 2019-08-08 RX ADMIN — Medication 100 MILLIGRAM(S): at 08:20

## 2019-08-08 RX ADMIN — Medication 100 MILLIGRAM(S): at 21:06

## 2019-08-08 RX ADMIN — SENNA PLUS 1 TABLET(S): 8.6 TABLET ORAL at 21:06

## 2019-08-08 RX ADMIN — INSULIN GLARGINE 10 UNIT(S): 100 INJECTION, SOLUTION SUBCUTANEOUS at 08:36

## 2019-08-08 RX ADMIN — Medication 1 MILLIGRAM(S): at 03:35

## 2019-08-08 RX ADMIN — Medication 1 MILLIGRAM(S): at 21:06

## 2019-08-08 RX ADMIN — Medication 1 MILLIGRAM(S): at 08:20

## 2019-08-08 RX ADMIN — Medication 150 MICROGRAM(S): at 08:20

## 2019-08-08 RX ADMIN — SENNA PLUS 1 TABLET(S): 8.6 TABLET ORAL at 08:20

## 2019-08-08 RX ADMIN — Medication 1 TABLET(S): at 08:20

## 2019-08-08 RX ADMIN — ATORVASTATIN CALCIUM 20 MILLIGRAM(S): 80 TABLET, FILM COATED ORAL at 21:06

## 2019-08-08 RX ADMIN — Medication 5 MILLIGRAM(S): at 21:06

## 2019-08-08 RX ADMIN — Medication 1 MILLIGRAM(S): at 14:45

## 2019-08-08 RX ADMIN — Medication 6: at 17:24

## 2019-08-09 LAB
GLUCOSE BLDC GLUCOMTR-MCNC: 137 MG/DL — HIGH (ref 70–99)
GLUCOSE BLDC GLUCOMTR-MCNC: 146 MG/DL — HIGH (ref 70–99)
GLUCOSE BLDC GLUCOMTR-MCNC: 179 MG/DL — HIGH (ref 70–99)
GLUCOSE BLDC GLUCOMTR-MCNC: 302 MG/DL — HIGH (ref 70–99)

## 2019-08-09 PROCEDURE — 99232 SBSQ HOSP IP/OBS MODERATE 35: CPT | Mod: 25

## 2019-08-09 RX ADMIN — Medication 8: at 17:17

## 2019-08-09 RX ADMIN — Medication 1 MILLIGRAM(S): at 20:22

## 2019-08-09 RX ADMIN — Medication 100 MILLIGRAM(S): at 20:22

## 2019-08-09 RX ADMIN — Medication 1 MILLIGRAM(S): at 17:10

## 2019-08-09 RX ADMIN — ATORVASTATIN CALCIUM 20 MILLIGRAM(S): 80 TABLET, FILM COATED ORAL at 20:22

## 2019-08-09 RX ADMIN — Medication 400 MILLIGRAM(S): at 21:13

## 2019-08-09 RX ADMIN — INSULIN GLARGINE 50 UNIT(S): 100 INJECTION, SOLUTION SUBCUTANEOUS at 21:10

## 2019-08-09 RX ADMIN — Medication 400 MILLIGRAM(S): at 12:40

## 2019-08-09 RX ADMIN — SENNA PLUS 1 TABLET(S): 8.6 TABLET ORAL at 20:23

## 2019-08-10 LAB
GLUCOSE BLDC GLUCOMTR-MCNC: 115 MG/DL — HIGH (ref 70–99)
GLUCOSE BLDC GLUCOMTR-MCNC: 227 MG/DL — HIGH (ref 70–99)
GLUCOSE BLDC GLUCOMTR-MCNC: 244 MG/DL — HIGH (ref 70–99)
GLUCOSE BLDC GLUCOMTR-MCNC: 249 MG/DL — HIGH (ref 70–99)

## 2019-08-10 RX ADMIN — INSULIN GLARGINE 10 UNIT(S): 100 INJECTION, SOLUTION SUBCUTANEOUS at 08:35

## 2019-08-10 RX ADMIN — Medication 4: at 17:16

## 2019-08-10 RX ADMIN — Medication 100 MILLIGRAM(S): at 08:35

## 2019-08-10 RX ADMIN — SENNA PLUS 1 TABLET(S): 8.6 TABLET ORAL at 08:35

## 2019-08-10 RX ADMIN — ATORVASTATIN CALCIUM 20 MILLIGRAM(S): 80 TABLET, FILM COATED ORAL at 21:05

## 2019-08-10 RX ADMIN — Medication 100 MILLIGRAM(S): at 21:05

## 2019-08-10 RX ADMIN — Medication 150 MICROGRAM(S): at 08:35

## 2019-08-10 RX ADMIN — INSULIN GLARGINE 50 UNIT(S): 100 INJECTION, SOLUTION SUBCUTANEOUS at 21:09

## 2019-08-10 RX ADMIN — SENNA PLUS 1 TABLET(S): 8.6 TABLET ORAL at 21:06

## 2019-08-10 RX ADMIN — Medication 1 MILLIGRAM(S): at 08:35

## 2019-08-10 RX ADMIN — Medication 1 TABLET(S): at 08:35

## 2019-08-10 RX ADMIN — Medication 5 MILLIGRAM(S): at 22:23

## 2019-08-10 RX ADMIN — Medication 1 MILLIGRAM(S): at 15:34

## 2019-08-10 RX ADMIN — Medication 1 MILLIGRAM(S): at 21:06

## 2019-08-10 RX ADMIN — Medication 4: at 12:50

## 2019-08-11 LAB
GLUCOSE BLDC GLUCOMTR-MCNC: 142 MG/DL — HIGH (ref 70–99)
GLUCOSE BLDC GLUCOMTR-MCNC: 188 MG/DL — HIGH (ref 70–99)
GLUCOSE BLDC GLUCOMTR-MCNC: 236 MG/DL — HIGH (ref 70–99)
GLUCOSE BLDC GLUCOMTR-MCNC: 242 MG/DL — HIGH (ref 70–99)

## 2019-08-11 RX ADMIN — INSULIN GLARGINE 10 UNIT(S): 100 INJECTION, SOLUTION SUBCUTANEOUS at 08:43

## 2019-08-11 RX ADMIN — ATORVASTATIN CALCIUM 20 MILLIGRAM(S): 80 TABLET, FILM COATED ORAL at 20:31

## 2019-08-11 RX ADMIN — Medication 4: at 17:39

## 2019-08-11 RX ADMIN — Medication 1 TABLET(S): at 08:43

## 2019-08-11 RX ADMIN — Medication 5 MILLIGRAM(S): at 23:00

## 2019-08-11 RX ADMIN — Medication 1 DROP(S): at 19:59

## 2019-08-11 RX ADMIN — Medication 4: at 12:30

## 2019-08-11 RX ADMIN — SENNA PLUS 1 TABLET(S): 8.6 TABLET ORAL at 08:43

## 2019-08-11 RX ADMIN — INSULIN GLARGINE 50 UNIT(S): 100 INJECTION, SOLUTION SUBCUTANEOUS at 21:06

## 2019-08-11 RX ADMIN — Medication 100 MILLIGRAM(S): at 20:31

## 2019-08-11 RX ADMIN — SENNA PLUS 1 TABLET(S): 8.6 TABLET ORAL at 21:07

## 2019-08-11 RX ADMIN — Medication 100 MILLIGRAM(S): at 08:43

## 2019-08-11 RX ADMIN — Medication 1 DROP(S): at 23:01

## 2019-08-11 RX ADMIN — Medication 150 MICROGRAM(S): at 08:43

## 2019-08-11 RX ADMIN — Medication 1 MILLIGRAM(S): at 20:12

## 2019-08-11 RX ADMIN — Medication 1 MILLIGRAM(S): at 08:43

## 2019-08-12 LAB
GLUCOSE BLDC GLUCOMTR-MCNC: 152 MG/DL — HIGH (ref 70–99)
GLUCOSE BLDC GLUCOMTR-MCNC: 156 MG/DL — HIGH (ref 70–99)
GLUCOSE BLDC GLUCOMTR-MCNC: 287 MG/DL — HIGH (ref 70–99)
GLUCOSE BLDC GLUCOMTR-MCNC: 304 MG/DL — HIGH (ref 70–99)

## 2019-08-12 PROCEDURE — 99232 SBSQ HOSP IP/OBS MODERATE 35: CPT | Mod: 25

## 2019-08-12 RX ADMIN — Medication 650 MILLIGRAM(S): at 15:07

## 2019-08-12 RX ADMIN — Medication 650 MILLIGRAM(S): at 14:01

## 2019-08-12 RX ADMIN — Medication 1 MILLIGRAM(S): at 23:18

## 2019-08-12 RX ADMIN — Medication 1 DROP(S): at 03:14

## 2019-08-12 RX ADMIN — SENNA PLUS 1 TABLET(S): 8.6 TABLET ORAL at 20:01

## 2019-08-12 RX ADMIN — Medication 2: at 21:43

## 2019-08-12 RX ADMIN — ATORVASTATIN CALCIUM 20 MILLIGRAM(S): 80 TABLET, FILM COATED ORAL at 20:01

## 2019-08-12 RX ADMIN — Medication 6: at 17:19

## 2019-08-12 RX ADMIN — Medication 1 MILLIGRAM(S): at 20:01

## 2019-08-12 RX ADMIN — Medication 5 MILLIGRAM(S): at 23:18

## 2019-08-12 RX ADMIN — INSULIN GLARGINE 50 UNIT(S): 100 INJECTION, SOLUTION SUBCUTANEOUS at 21:43

## 2019-08-12 RX ADMIN — Medication 100 MILLIGRAM(S): at 20:01

## 2019-08-13 LAB
GLUCOSE BLDC GLUCOMTR-MCNC: 162 MG/DL — HIGH (ref 70–99)
GLUCOSE BLDC GLUCOMTR-MCNC: 168 MG/DL — HIGH (ref 70–99)
GLUCOSE BLDC GLUCOMTR-MCNC: 211 MG/DL — HIGH (ref 70–99)
GLUCOSE BLDC GLUCOMTR-MCNC: 260 MG/DL — HIGH (ref 70–99)

## 2019-08-13 PROCEDURE — 99232 SBSQ HOSP IP/OBS MODERATE 35: CPT

## 2019-08-13 RX ORDER — HYDROXYZINE HCL 10 MG
25 TABLET ORAL ONCE
Refills: 0 | Status: COMPLETED | OUTPATIENT
Start: 2019-08-13 | End: 2019-08-13

## 2019-08-13 RX ADMIN — Medication 1 TABLET(S): at 08:44

## 2019-08-13 RX ADMIN — Medication 100 MILLIGRAM(S): at 20:56

## 2019-08-13 RX ADMIN — INSULIN GLARGINE 50 UNIT(S): 100 INJECTION, SOLUTION SUBCUTANEOUS at 21:21

## 2019-08-13 RX ADMIN — Medication 1 MILLIGRAM(S): at 20:33

## 2019-08-13 RX ADMIN — SENNA PLUS 1 TABLET(S): 8.6 TABLET ORAL at 20:56

## 2019-08-13 RX ADMIN — Medication 6: at 17:24

## 2019-08-13 RX ADMIN — INSULIN GLARGINE 10 UNIT(S): 100 INJECTION, SOLUTION SUBCUTANEOUS at 08:39

## 2019-08-13 RX ADMIN — ATORVASTATIN CALCIUM 20 MILLIGRAM(S): 80 TABLET, FILM COATED ORAL at 20:56

## 2019-08-13 RX ADMIN — Medication 25 MILLIGRAM(S): at 20:55

## 2019-08-13 RX ADMIN — Medication 100 MILLIGRAM(S): at 08:43

## 2019-08-13 RX ADMIN — Medication 1 MILLIGRAM(S): at 08:43

## 2019-08-13 RX ADMIN — Medication 150 MICROGRAM(S): at 08:43

## 2019-08-13 RX ADMIN — Medication 2: at 08:39

## 2019-08-13 RX ADMIN — SENNA PLUS 1 TABLET(S): 8.6 TABLET ORAL at 08:44

## 2019-08-13 RX ADMIN — Medication 4: at 12:34

## 2019-08-14 LAB
GLUCOSE BLDC GLUCOMTR-MCNC: 123 MG/DL — HIGH (ref 70–99)
GLUCOSE BLDC GLUCOMTR-MCNC: 124 MG/DL — HIGH (ref 70–99)
GLUCOSE BLDC GLUCOMTR-MCNC: 338 MG/DL — HIGH (ref 70–99)

## 2019-08-14 PROCEDURE — 99232 SBSQ HOSP IP/OBS MODERATE 35: CPT | Mod: 25

## 2019-08-14 RX ADMIN — Medication 650 MILLIGRAM(S): at 21:07

## 2019-08-14 RX ADMIN — INSULIN GLARGINE 50 UNIT(S): 100 INJECTION, SOLUTION SUBCUTANEOUS at 21:27

## 2019-08-14 RX ADMIN — Medication 8: at 17:14

## 2019-08-14 RX ADMIN — Medication 1 MILLIGRAM(S): at 20:39

## 2019-08-14 RX ADMIN — Medication 650 MILLIGRAM(S): at 19:34

## 2019-08-14 RX ADMIN — SENNA PLUS 1 TABLET(S): 8.6 TABLET ORAL at 20:39

## 2019-08-14 RX ADMIN — Medication 5 MILLIGRAM(S): at 21:27

## 2019-08-14 RX ADMIN — Medication 100 MILLIGRAM(S): at 20:39

## 2019-08-14 RX ADMIN — ATORVASTATIN CALCIUM 20 MILLIGRAM(S): 80 TABLET, FILM COATED ORAL at 20:39

## 2019-08-15 LAB
GLUCOSE BLDC GLUCOMTR-MCNC: 136 MG/DL — HIGH (ref 70–99)
GLUCOSE BLDC GLUCOMTR-MCNC: 139 MG/DL — HIGH (ref 70–99)
GLUCOSE BLDC GLUCOMTR-MCNC: 196 MG/DL — HIGH (ref 70–99)
GLUCOSE BLDC GLUCOMTR-MCNC: 211 MG/DL — HIGH (ref 70–99)

## 2019-08-15 PROCEDURE — 99232 SBSQ HOSP IP/OBS MODERATE 35: CPT

## 2019-08-15 RX ADMIN — Medication 100 MILLIGRAM(S): at 08:45

## 2019-08-15 RX ADMIN — Medication 1 MILLIGRAM(S): at 20:20

## 2019-08-15 RX ADMIN — Medication 100 MILLIGRAM(S): at 20:20

## 2019-08-15 RX ADMIN — Medication 150 MICROGRAM(S): at 08:44

## 2019-08-15 RX ADMIN — SENNA PLUS 1 TABLET(S): 8.6 TABLET ORAL at 20:20

## 2019-08-15 RX ADMIN — Medication 5 MILLIGRAM(S): at 23:00

## 2019-08-15 RX ADMIN — Medication 1 TABLET(S): at 08:44

## 2019-08-15 RX ADMIN — ATORVASTATIN CALCIUM 20 MILLIGRAM(S): 80 TABLET, FILM COATED ORAL at 20:20

## 2019-08-15 RX ADMIN — INSULIN GLARGINE 50 UNIT(S): 100 INJECTION, SOLUTION SUBCUTANEOUS at 21:28

## 2019-08-15 RX ADMIN — Medication 1 MILLIGRAM(S): at 08:44

## 2019-08-15 RX ADMIN — INSULIN GLARGINE 10 UNIT(S): 100 INJECTION, SOLUTION SUBCUTANEOUS at 08:44

## 2019-08-15 RX ADMIN — Medication 4: at 13:00

## 2019-08-15 RX ADMIN — SENNA PLUS 1 TABLET(S): 8.6 TABLET ORAL at 08:44

## 2019-08-16 LAB
GLUCOSE BLDC GLUCOMTR-MCNC: 114 MG/DL — HIGH (ref 70–99)
GLUCOSE BLDC GLUCOMTR-MCNC: 198 MG/DL — HIGH (ref 70–99)
GLUCOSE BLDC GLUCOMTR-MCNC: 233 MG/DL — HIGH (ref 70–99)
GLUCOSE BLDC GLUCOMTR-MCNC: 239 MG/DL — HIGH (ref 70–99)

## 2019-08-16 PROCEDURE — 99232 SBSQ HOSP IP/OBS MODERATE 35: CPT | Mod: 25

## 2019-08-16 PROCEDURE — 90870 ELECTROCONVULSIVE THERAPY: CPT

## 2019-08-16 RX ORDER — LITHIUM CARBONATE 300 MG/1
300 TABLET, EXTENDED RELEASE ORAL AT BEDTIME
Refills: 0 | Status: DISCONTINUED | OUTPATIENT
Start: 2019-08-16 | End: 2019-08-22

## 2019-08-16 RX ORDER — GABAPENTIN 400 MG/1
100 CAPSULE ORAL THREE TIMES A DAY
Refills: 0 | Status: DISCONTINUED | OUTPATIENT
Start: 2019-08-16 | End: 2019-08-19

## 2019-08-16 RX ADMIN — Medication 2: at 17:11

## 2019-08-16 RX ADMIN — Medication 650 MILLIGRAM(S): at 18:40

## 2019-08-16 RX ADMIN — INSULIN GLARGINE 50 UNIT(S): 100 INJECTION, SOLUTION SUBCUTANEOUS at 21:19

## 2019-08-16 RX ADMIN — Medication 1 MILLIGRAM(S): at 20:25

## 2019-08-16 RX ADMIN — ATORVASTATIN CALCIUM 20 MILLIGRAM(S): 80 TABLET, FILM COATED ORAL at 20:50

## 2019-08-16 RX ADMIN — LITHIUM CARBONATE 300 MILLIGRAM(S): 300 TABLET, EXTENDED RELEASE ORAL at 20:51

## 2019-08-16 RX ADMIN — Medication 4: at 13:10

## 2019-08-16 RX ADMIN — Medication 400 MILLIGRAM(S): at 22:15

## 2019-08-16 RX ADMIN — Medication 650 MILLIGRAM(S): at 20:51

## 2019-08-16 RX ADMIN — SENNA PLUS 1 TABLET(S): 8.6 TABLET ORAL at 20:50

## 2019-08-16 RX ADMIN — Medication 100 MILLIGRAM(S): at 20:50

## 2019-08-16 RX ADMIN — GABAPENTIN 100 MILLIGRAM(S): 400 CAPSULE ORAL at 14:06

## 2019-08-16 RX ADMIN — GABAPENTIN 100 MILLIGRAM(S): 400 CAPSULE ORAL at 20:24

## 2019-08-16 RX ADMIN — Medication 400 MILLIGRAM(S): at 22:52

## 2019-08-17 LAB
GLUCOSE BLDC GLUCOMTR-MCNC: 141 MG/DL — HIGH (ref 70–99)
GLUCOSE BLDC GLUCOMTR-MCNC: 190 MG/DL — HIGH (ref 70–99)
GLUCOSE BLDC GLUCOMTR-MCNC: 230 MG/DL — HIGH (ref 70–99)
GLUCOSE BLDC GLUCOMTR-MCNC: 267 MG/DL — HIGH (ref 70–99)

## 2019-08-17 RX ADMIN — Medication 1 MILLIGRAM(S): at 22:53

## 2019-08-17 RX ADMIN — INSULIN GLARGINE 50 UNIT(S): 100 INJECTION, SOLUTION SUBCUTANEOUS at 21:42

## 2019-08-17 RX ADMIN — Medication 100 MILLIGRAM(S): at 21:42

## 2019-08-17 RX ADMIN — Medication 2: at 12:48

## 2019-08-17 RX ADMIN — INSULIN GLARGINE 10 UNIT(S): 100 INJECTION, SOLUTION SUBCUTANEOUS at 08:42

## 2019-08-17 RX ADMIN — SENNA PLUS 1 TABLET(S): 8.6 TABLET ORAL at 08:43

## 2019-08-17 RX ADMIN — ATORVASTATIN CALCIUM 20 MILLIGRAM(S): 80 TABLET, FILM COATED ORAL at 21:42

## 2019-08-17 RX ADMIN — Medication 1: at 21:43

## 2019-08-17 RX ADMIN — LITHIUM CARBONATE 300 MILLIGRAM(S): 300 TABLET, EXTENDED RELEASE ORAL at 21:43

## 2019-08-17 RX ADMIN — SENNA PLUS 1 TABLET(S): 8.6 TABLET ORAL at 21:43

## 2019-08-17 RX ADMIN — Medication 1 TABLET(S): at 08:43

## 2019-08-17 RX ADMIN — Medication 4: at 17:18

## 2019-08-17 RX ADMIN — Medication 150 MICROGRAM(S): at 08:43

## 2019-08-17 RX ADMIN — GABAPENTIN 100 MILLIGRAM(S): 400 CAPSULE ORAL at 12:48

## 2019-08-17 RX ADMIN — GABAPENTIN 100 MILLIGRAM(S): 400 CAPSULE ORAL at 08:42

## 2019-08-17 RX ADMIN — Medication 100 MILLIGRAM(S): at 08:42

## 2019-08-17 RX ADMIN — GABAPENTIN 100 MILLIGRAM(S): 400 CAPSULE ORAL at 21:42

## 2019-08-18 LAB
GLUCOSE BLDC GLUCOMTR-MCNC: 134 MG/DL — HIGH (ref 70–99)
GLUCOSE BLDC GLUCOMTR-MCNC: 173 MG/DL — HIGH (ref 70–99)
GLUCOSE BLDC GLUCOMTR-MCNC: 176 MG/DL — HIGH (ref 70–99)
GLUCOSE BLDC GLUCOMTR-MCNC: 222 MG/DL — HIGH (ref 70–99)

## 2019-08-18 RX ADMIN — Medication 1 MILLIGRAM(S): at 14:18

## 2019-08-18 RX ADMIN — Medication 1 TABLET(S): at 08:15

## 2019-08-18 RX ADMIN — Medication 150 MICROGRAM(S): at 08:15

## 2019-08-18 RX ADMIN — ATORVASTATIN CALCIUM 20 MILLIGRAM(S): 80 TABLET, FILM COATED ORAL at 21:32

## 2019-08-18 RX ADMIN — Medication 100 MILLIGRAM(S): at 21:32

## 2019-08-18 RX ADMIN — Medication 4: at 12:37

## 2019-08-18 RX ADMIN — Medication 2: at 16:44

## 2019-08-18 RX ADMIN — LITHIUM CARBONATE 300 MILLIGRAM(S): 300 TABLET, EXTENDED RELEASE ORAL at 21:33

## 2019-08-18 RX ADMIN — GABAPENTIN 100 MILLIGRAM(S): 400 CAPSULE ORAL at 12:35

## 2019-08-18 RX ADMIN — INSULIN GLARGINE 10 UNIT(S): 100 INJECTION, SOLUTION SUBCUTANEOUS at 09:45

## 2019-08-18 RX ADMIN — GABAPENTIN 100 MILLIGRAM(S): 400 CAPSULE ORAL at 08:15

## 2019-08-18 RX ADMIN — INSULIN GLARGINE 50 UNIT(S): 100 INJECTION, SOLUTION SUBCUTANEOUS at 21:32

## 2019-08-18 RX ADMIN — SENNA PLUS 1 TABLET(S): 8.6 TABLET ORAL at 08:15

## 2019-08-18 RX ADMIN — GABAPENTIN 100 MILLIGRAM(S): 400 CAPSULE ORAL at 21:32

## 2019-08-18 RX ADMIN — Medication 100 MILLIGRAM(S): at 08:15

## 2019-08-18 RX ADMIN — SENNA PLUS 1 TABLET(S): 8.6 TABLET ORAL at 21:33

## 2019-08-19 LAB
GLUCOSE BLDC GLUCOMTR-MCNC: 138 MG/DL — HIGH (ref 70–99)
GLUCOSE BLDC GLUCOMTR-MCNC: 196 MG/DL — HIGH (ref 70–99)
GLUCOSE BLDC GLUCOMTR-MCNC: 239 MG/DL — HIGH (ref 70–99)
GLUCOSE BLDC GLUCOMTR-MCNC: 269 MG/DL — HIGH (ref 70–99)

## 2019-08-19 PROCEDURE — 90853 GROUP PSYCHOTHERAPY: CPT

## 2019-08-19 PROCEDURE — 99232 SBSQ HOSP IP/OBS MODERATE 35: CPT

## 2019-08-19 RX ORDER — GABAPENTIN 400 MG/1
200 CAPSULE ORAL THREE TIMES A DAY
Refills: 0 | Status: DISCONTINUED | OUTPATIENT
Start: 2019-08-19 | End: 2019-08-21

## 2019-08-19 RX ADMIN — INSULIN GLARGINE 10 UNIT(S): 100 INJECTION, SOLUTION SUBCUTANEOUS at 08:34

## 2019-08-19 RX ADMIN — Medication 100 MILLIGRAM(S): at 08:47

## 2019-08-19 RX ADMIN — Medication 150 MICROGRAM(S): at 07:23

## 2019-08-19 RX ADMIN — SENNA PLUS 1 TABLET(S): 8.6 TABLET ORAL at 20:54

## 2019-08-19 RX ADMIN — GABAPENTIN 200 MILLIGRAM(S): 400 CAPSULE ORAL at 08:47

## 2019-08-19 RX ADMIN — GABAPENTIN 200 MILLIGRAM(S): 400 CAPSULE ORAL at 20:53

## 2019-08-19 RX ADMIN — Medication 0.5 MILLIGRAM(S): at 12:13

## 2019-08-19 RX ADMIN — INSULIN GLARGINE 50 UNIT(S): 100 INJECTION, SOLUTION SUBCUTANEOUS at 21:51

## 2019-08-19 RX ADMIN — Medication 1 MILLIGRAM(S): at 07:23

## 2019-08-19 RX ADMIN — GABAPENTIN 200 MILLIGRAM(S): 400 CAPSULE ORAL at 12:17

## 2019-08-19 RX ADMIN — SENNA PLUS 1 TABLET(S): 8.6 TABLET ORAL at 08:47

## 2019-08-19 RX ADMIN — Medication 100 MILLIGRAM(S): at 20:54

## 2019-08-19 RX ADMIN — Medication 4: at 12:13

## 2019-08-19 RX ADMIN — Medication 0.5 MILLIGRAM(S): at 20:53

## 2019-08-19 RX ADMIN — ATORVASTATIN CALCIUM 20 MILLIGRAM(S): 80 TABLET, FILM COATED ORAL at 20:53

## 2019-08-19 RX ADMIN — Medication 1 TABLET(S): at 08:47

## 2019-08-19 RX ADMIN — Medication 6: at 16:57

## 2019-08-19 RX ADMIN — LITHIUM CARBONATE 300 MILLIGRAM(S): 300 TABLET, EXTENDED RELEASE ORAL at 20:54

## 2019-08-20 LAB
GLUCOSE BLDC GLUCOMTR-MCNC: 144 MG/DL — HIGH (ref 70–99)
GLUCOSE BLDC GLUCOMTR-MCNC: 210 MG/DL — HIGH (ref 70–99)
GLUCOSE BLDC GLUCOMTR-MCNC: 253 MG/DL — HIGH (ref 70–99)
GLUCOSE BLDC GLUCOMTR-MCNC: 255 MG/DL — HIGH (ref 70–99)

## 2019-08-20 PROCEDURE — 99232 SBSQ HOSP IP/OBS MODERATE 35: CPT

## 2019-08-20 RX ADMIN — INSULIN GLARGINE 50 UNIT(S): 100 INJECTION, SOLUTION SUBCUTANEOUS at 21:22

## 2019-08-20 RX ADMIN — GABAPENTIN 200 MILLIGRAM(S): 400 CAPSULE ORAL at 12:30

## 2019-08-20 RX ADMIN — SENNA PLUS 1 TABLET(S): 8.6 TABLET ORAL at 20:32

## 2019-08-20 RX ADMIN — SENNA PLUS 1 TABLET(S): 8.6 TABLET ORAL at 08:14

## 2019-08-20 RX ADMIN — Medication 650 MILLIGRAM(S): at 15:45

## 2019-08-20 RX ADMIN — LITHIUM CARBONATE 300 MILLIGRAM(S): 300 TABLET, EXTENDED RELEASE ORAL at 20:32

## 2019-08-20 RX ADMIN — INSULIN GLARGINE 10 UNIT(S): 100 INJECTION, SOLUTION SUBCUTANEOUS at 09:16

## 2019-08-20 RX ADMIN — GABAPENTIN 200 MILLIGRAM(S): 400 CAPSULE ORAL at 08:15

## 2019-08-20 RX ADMIN — Medication 6: at 12:31

## 2019-08-20 RX ADMIN — GABAPENTIN 200 MILLIGRAM(S): 400 CAPSULE ORAL at 20:32

## 2019-08-20 RX ADMIN — Medication 0.5 MILLIGRAM(S): at 08:14

## 2019-08-20 RX ADMIN — Medication 1: at 21:18

## 2019-08-20 RX ADMIN — Medication 1 DROP(S): at 23:10

## 2019-08-20 RX ADMIN — Medication 650 MILLIGRAM(S): at 17:01

## 2019-08-20 RX ADMIN — ATORVASTATIN CALCIUM 20 MILLIGRAM(S): 80 TABLET, FILM COATED ORAL at 20:31

## 2019-08-20 RX ADMIN — Medication 4: at 17:07

## 2019-08-20 RX ADMIN — Medication 100 MILLIGRAM(S): at 08:14

## 2019-08-20 RX ADMIN — Medication 1 TABLET(S): at 08:14

## 2019-08-20 RX ADMIN — Medication 100 MILLIGRAM(S): at 20:31

## 2019-08-20 RX ADMIN — Medication 0.5 MILLIGRAM(S): at 20:32

## 2019-08-20 RX ADMIN — Medication 150 MICROGRAM(S): at 08:14

## 2019-08-21 LAB
ALBUMIN SERPL ELPH-MCNC: 3.9 G/DL — SIGNIFICANT CHANGE UP (ref 3.3–5)
ALP SERPL-CCNC: 85 U/L — SIGNIFICANT CHANGE UP (ref 40–120)
ALT FLD-CCNC: 27 U/L — SIGNIFICANT CHANGE UP (ref 4–41)
ANION GAP SERPL CALC-SCNC: 15 MMO/L — HIGH (ref 7–14)
AST SERPL-CCNC: 19 U/L — SIGNIFICANT CHANGE UP (ref 4–40)
BILIRUB SERPL-MCNC: 0.6 MG/DL — SIGNIFICANT CHANGE UP (ref 0.2–1.2)
BUN SERPL-MCNC: 14 MG/DL — SIGNIFICANT CHANGE UP (ref 7–23)
CALCIUM SERPL-MCNC: 9.9 MG/DL — SIGNIFICANT CHANGE UP (ref 8.4–10.5)
CHLORIDE SERPL-SCNC: 102 MMOL/L — SIGNIFICANT CHANGE UP (ref 98–107)
CO2 SERPL-SCNC: 25 MMOL/L — SIGNIFICANT CHANGE UP (ref 22–31)
CREAT SERPL-MCNC: 0.9 MG/DL — SIGNIFICANT CHANGE UP (ref 0.5–1.3)
GLUCOSE BLDC GLUCOMTR-MCNC: 129 MG/DL — HIGH (ref 70–99)
GLUCOSE BLDC GLUCOMTR-MCNC: 152 MG/DL — HIGH (ref 70–99)
GLUCOSE BLDC GLUCOMTR-MCNC: 165 MG/DL — HIGH (ref 70–99)
GLUCOSE BLDC GLUCOMTR-MCNC: 263 MG/DL — HIGH (ref 70–99)
GLUCOSE SERPL-MCNC: 179 MG/DL — HIGH (ref 70–99)
HCT VFR BLD CALC: 45.1 % — SIGNIFICANT CHANGE UP (ref 39–50)
HGB BLD-MCNC: 14.8 G/DL — SIGNIFICANT CHANGE UP (ref 13–17)
LITHIUM SERPL-MCNC: 0.26 MMOL/L — LOW (ref 0.6–1.2)
MCHC RBC-ENTMCNC: 30.7 PG — SIGNIFICANT CHANGE UP (ref 27–34)
MCHC RBC-ENTMCNC: 32.8 % — SIGNIFICANT CHANGE UP (ref 32–36)
MCV RBC AUTO: 93.6 FL — SIGNIFICANT CHANGE UP (ref 80–100)
NRBC # FLD: 0 K/UL — SIGNIFICANT CHANGE UP (ref 0–0)
PLATELET # BLD AUTO: 166 K/UL — SIGNIFICANT CHANGE UP (ref 150–400)
PMV BLD: 10.7 FL — SIGNIFICANT CHANGE UP (ref 7–13)
POTASSIUM SERPL-MCNC: 4.4 MMOL/L — SIGNIFICANT CHANGE UP (ref 3.5–5.3)
POTASSIUM SERPL-SCNC: 4.4 MMOL/L — SIGNIFICANT CHANGE UP (ref 3.5–5.3)
PROT SERPL-MCNC: 7.7 G/DL — SIGNIFICANT CHANGE UP (ref 6–8.3)
RBC # BLD: 4.82 M/UL — SIGNIFICANT CHANGE UP (ref 4.2–5.8)
RBC # FLD: 12.6 % — SIGNIFICANT CHANGE UP (ref 10.3–14.5)
SODIUM SERPL-SCNC: 142 MMOL/L — SIGNIFICANT CHANGE UP (ref 135–145)
WBC # BLD: 9.25 K/UL — SIGNIFICANT CHANGE UP (ref 3.8–10.5)
WBC # FLD AUTO: 9.25 K/UL — SIGNIFICANT CHANGE UP (ref 3.8–10.5)

## 2019-08-21 PROCEDURE — 99232 SBSQ HOSP IP/OBS MODERATE 35: CPT

## 2019-08-21 RX ORDER — GABAPENTIN 400 MG/1
300 CAPSULE ORAL THREE TIMES A DAY
Refills: 0 | Status: DISCONTINUED | OUTPATIENT
Start: 2019-08-21 | End: 2019-08-23

## 2019-08-21 RX ORDER — GABAPENTIN 400 MG/1
100 CAPSULE ORAL ONCE
Refills: 0 | Status: COMPLETED | OUTPATIENT
Start: 2019-08-21 | End: 2019-08-21

## 2019-08-21 RX ADMIN — GABAPENTIN 300 MILLIGRAM(S): 400 CAPSULE ORAL at 20:18

## 2019-08-21 RX ADMIN — Medication 400 MILLIGRAM(S): at 22:19

## 2019-08-21 RX ADMIN — Medication 100 MILLIGRAM(S): at 08:53

## 2019-08-21 RX ADMIN — Medication 150 MICROGRAM(S): at 08:52

## 2019-08-21 RX ADMIN — Medication 6: at 17:29

## 2019-08-21 RX ADMIN — GABAPENTIN 200 MILLIGRAM(S): 400 CAPSULE ORAL at 08:53

## 2019-08-21 RX ADMIN — LITHIUM CARBONATE 300 MILLIGRAM(S): 300 TABLET, EXTENDED RELEASE ORAL at 20:18

## 2019-08-21 RX ADMIN — INSULIN GLARGINE 10 UNIT(S): 100 INJECTION, SOLUTION SUBCUTANEOUS at 08:53

## 2019-08-21 RX ADMIN — Medication 1 DROP(S): at 03:31

## 2019-08-21 RX ADMIN — Medication 1 DROP(S): at 22:35

## 2019-08-21 RX ADMIN — ATORVASTATIN CALCIUM 20 MILLIGRAM(S): 80 TABLET, FILM COATED ORAL at 20:18

## 2019-08-21 RX ADMIN — Medication 650 MILLIGRAM(S): at 14:41

## 2019-08-21 RX ADMIN — GABAPENTIN 200 MILLIGRAM(S): 400 CAPSULE ORAL at 12:41

## 2019-08-21 RX ADMIN — Medication 1 TABLET(S): at 08:52

## 2019-08-21 RX ADMIN — INSULIN GLARGINE 50 UNIT(S): 100 INJECTION, SOLUTION SUBCUTANEOUS at 21:49

## 2019-08-21 RX ADMIN — Medication 0: at 21:47

## 2019-08-21 RX ADMIN — Medication 0.5 MILLIGRAM(S): at 20:18

## 2019-08-21 RX ADMIN — Medication 100 MILLIGRAM(S): at 20:18

## 2019-08-21 RX ADMIN — Medication 400 MILLIGRAM(S): at 21:47

## 2019-08-21 RX ADMIN — GABAPENTIN 100 MILLIGRAM(S): 400 CAPSULE ORAL at 15:28

## 2019-08-21 RX ADMIN — Medication 5 MILLIGRAM(S): at 20:26

## 2019-08-21 RX ADMIN — Medication 0.5 MILLIGRAM(S): at 08:50

## 2019-08-21 RX ADMIN — SENNA PLUS 1 TABLET(S): 8.6 TABLET ORAL at 20:18

## 2019-08-21 RX ADMIN — SENNA PLUS 1 TABLET(S): 8.6 TABLET ORAL at 08:52

## 2019-08-21 RX ADMIN — Medication 2: at 12:41

## 2019-08-22 LAB
GLUCOSE BLDC GLUCOMTR-MCNC: 138 MG/DL — HIGH (ref 70–99)
GLUCOSE BLDC GLUCOMTR-MCNC: 196 MG/DL — HIGH (ref 70–99)
GLUCOSE BLDC GLUCOMTR-MCNC: 213 MG/DL — HIGH (ref 70–99)
GLUCOSE BLDC GLUCOMTR-MCNC: 246 MG/DL — HIGH (ref 70–99)
LITHIUM SERPL-MCNC: 0.31 MMOL/L — LOW (ref 0.6–1.2)

## 2019-08-22 PROCEDURE — 99232 SBSQ HOSP IP/OBS MODERATE 35: CPT

## 2019-08-22 RX ORDER — LITHIUM CARBONATE 300 MG/1
300 TABLET, EXTENDED RELEASE ORAL
Refills: 0 | Status: DISCONTINUED | OUTPATIENT
Start: 2019-08-22 | End: 2019-08-24

## 2019-08-22 RX ADMIN — INSULIN GLARGINE 10 UNIT(S): 100 INJECTION, SOLUTION SUBCUTANEOUS at 08:48

## 2019-08-22 RX ADMIN — SENNA PLUS 1 TABLET(S): 8.6 TABLET ORAL at 09:20

## 2019-08-22 RX ADMIN — ATORVASTATIN CALCIUM 20 MILLIGRAM(S): 80 TABLET, FILM COATED ORAL at 20:56

## 2019-08-22 RX ADMIN — Medication 4: at 12:39

## 2019-08-22 RX ADMIN — Medication 0.5 MILLIGRAM(S): at 09:20

## 2019-08-22 RX ADMIN — Medication 100 MILLIGRAM(S): at 20:56

## 2019-08-22 RX ADMIN — Medication 150 MICROGRAM(S): at 07:39

## 2019-08-22 RX ADMIN — GABAPENTIN 300 MILLIGRAM(S): 400 CAPSULE ORAL at 09:20

## 2019-08-22 RX ADMIN — Medication 0.5 MILLIGRAM(S): at 20:57

## 2019-08-22 RX ADMIN — GABAPENTIN 300 MILLIGRAM(S): 400 CAPSULE ORAL at 13:18

## 2019-08-22 RX ADMIN — Medication 100 MILLIGRAM(S): at 09:20

## 2019-08-22 RX ADMIN — Medication 650 MILLIGRAM(S): at 16:41

## 2019-08-22 RX ADMIN — GABAPENTIN 300 MILLIGRAM(S): 400 CAPSULE ORAL at 20:56

## 2019-08-22 RX ADMIN — Medication 4: at 17:14

## 2019-08-22 RX ADMIN — SENNA PLUS 1 TABLET(S): 8.6 TABLET ORAL at 20:57

## 2019-08-22 RX ADMIN — INSULIN GLARGINE 50 UNIT(S): 100 INJECTION, SOLUTION SUBCUTANEOUS at 21:44

## 2019-08-22 RX ADMIN — Medication 1 TABLET(S): at 09:20

## 2019-08-22 RX ADMIN — Medication 650 MILLIGRAM(S): at 17:14

## 2019-08-22 RX ADMIN — LITHIUM CARBONATE 300 MILLIGRAM(S): 300 TABLET, EXTENDED RELEASE ORAL at 20:57

## 2019-08-23 LAB
GLUCOSE BLDC GLUCOMTR-MCNC: 106 MG/DL — HIGH (ref 70–99)
GLUCOSE BLDC GLUCOMTR-MCNC: 200 MG/DL — HIGH (ref 70–99)
GLUCOSE BLDC GLUCOMTR-MCNC: 212 MG/DL — HIGH (ref 70–99)
GLUCOSE BLDC GLUCOMTR-MCNC: 225 MG/DL — HIGH (ref 70–99)

## 2019-08-23 PROCEDURE — 99232 SBSQ HOSP IP/OBS MODERATE 35: CPT

## 2019-08-23 RX ORDER — QUETIAPINE FUMARATE 200 MG/1
75 TABLET, FILM COATED ORAL AT BEDTIME
Refills: 0 | Status: COMPLETED | OUTPATIENT
Start: 2019-08-24 | End: 2019-08-24

## 2019-08-23 RX ORDER — QUETIAPINE FUMARATE 200 MG/1
100 TABLET, FILM COATED ORAL AT BEDTIME
Refills: 0 | Status: DISCONTINUED | OUTPATIENT
Start: 2019-08-25 | End: 2019-08-26

## 2019-08-23 RX ORDER — QUETIAPINE FUMARATE 200 MG/1
25 TABLET, FILM COATED ORAL EVERY 6 HOURS
Refills: 0 | Status: DISCONTINUED | OUTPATIENT
Start: 2019-08-23 | End: 2019-08-30

## 2019-08-23 RX ORDER — QUETIAPINE FUMARATE 200 MG/1
50 TABLET, FILM COATED ORAL AT BEDTIME
Refills: 0 | Status: COMPLETED | OUTPATIENT
Start: 2019-08-23 | End: 2019-08-23

## 2019-08-23 RX ORDER — GABAPENTIN 400 MG/1
400 CAPSULE ORAL THREE TIMES A DAY
Refills: 0 | Status: DISCONTINUED | OUTPATIENT
Start: 2019-08-23 | End: 2019-08-23

## 2019-08-23 RX ADMIN — ATORVASTATIN CALCIUM 20 MILLIGRAM(S): 80 TABLET, FILM COATED ORAL at 21:04

## 2019-08-23 RX ADMIN — Medication 100 MILLIGRAM(S): at 21:04

## 2019-08-23 RX ADMIN — Medication 2: at 12:41

## 2019-08-23 RX ADMIN — QUETIAPINE FUMARATE 50 MILLIGRAM(S): 200 TABLET, FILM COATED ORAL at 21:14

## 2019-08-23 RX ADMIN — INSULIN GLARGINE 10 UNIT(S): 100 INJECTION, SOLUTION SUBCUTANEOUS at 08:35

## 2019-08-23 RX ADMIN — Medication 150 MICROGRAM(S): at 07:31

## 2019-08-23 RX ADMIN — LITHIUM CARBONATE 300 MILLIGRAM(S): 300 TABLET, EXTENDED RELEASE ORAL at 21:04

## 2019-08-23 RX ADMIN — SENNA PLUS 1 TABLET(S): 8.6 TABLET ORAL at 21:04

## 2019-08-23 RX ADMIN — Medication 100 MILLIGRAM(S): at 08:53

## 2019-08-23 RX ADMIN — Medication 4: at 17:05

## 2019-08-23 RX ADMIN — GABAPENTIN 400 MILLIGRAM(S): 400 CAPSULE ORAL at 13:07

## 2019-08-23 RX ADMIN — Medication 650 MILLIGRAM(S): at 19:21

## 2019-08-23 RX ADMIN — SENNA PLUS 1 TABLET(S): 8.6 TABLET ORAL at 08:53

## 2019-08-23 RX ADMIN — QUETIAPINE FUMARATE 25 MILLIGRAM(S): 200 TABLET, FILM COATED ORAL at 15:27

## 2019-08-23 RX ADMIN — Medication 1 DROP(S): at 01:39

## 2019-08-23 RX ADMIN — Medication 0.5 MILLIGRAM(S): at 08:53

## 2019-08-23 RX ADMIN — Medication 0.5 MILLIGRAM(S): at 21:03

## 2019-08-23 RX ADMIN — INSULIN GLARGINE 50 UNIT(S): 100 INJECTION, SOLUTION SUBCUTANEOUS at 21:30

## 2019-08-23 RX ADMIN — Medication 1 TABLET(S): at 08:53

## 2019-08-23 RX ADMIN — Medication 650 MILLIGRAM(S): at 20:25

## 2019-08-23 RX ADMIN — LITHIUM CARBONATE 300 MILLIGRAM(S): 300 TABLET, EXTENDED RELEASE ORAL at 08:53

## 2019-08-23 RX ADMIN — GABAPENTIN 300 MILLIGRAM(S): 400 CAPSULE ORAL at 08:53

## 2019-08-24 LAB
GLUCOSE BLDC GLUCOMTR-MCNC: 129 MG/DL — HIGH (ref 70–99)
GLUCOSE BLDC GLUCOMTR-MCNC: 189 MG/DL — HIGH (ref 70–99)
GLUCOSE BLDC GLUCOMTR-MCNC: 197 MG/DL — HIGH (ref 70–99)
GLUCOSE BLDC GLUCOMTR-MCNC: 200 MG/DL — HIGH (ref 70–99)

## 2019-08-24 PROCEDURE — 99232 SBSQ HOSP IP/OBS MODERATE 35: CPT

## 2019-08-24 RX ORDER — LITHIUM CARBONATE 300 MG/1
300 TABLET, EXTENDED RELEASE ORAL
Refills: 0 | Status: COMPLETED | OUTPATIENT
Start: 2019-08-24 | End: 2019-08-24

## 2019-08-24 RX ADMIN — INSULIN GLARGINE 50 UNIT(S): 100 INJECTION, SOLUTION SUBCUTANEOUS at 21:30

## 2019-08-24 RX ADMIN — Medication 1 DROP(S): at 00:25

## 2019-08-24 RX ADMIN — SENNA PLUS 1 TABLET(S): 8.6 TABLET ORAL at 20:19

## 2019-08-24 RX ADMIN — Medication 650 MILLIGRAM(S): at 18:22

## 2019-08-24 RX ADMIN — QUETIAPINE FUMARATE 25 MILLIGRAM(S): 200 TABLET, FILM COATED ORAL at 23:17

## 2019-08-24 RX ADMIN — Medication 0: at 21:22

## 2019-08-24 RX ADMIN — Medication 2: at 17:22

## 2019-08-24 RX ADMIN — Medication 2: at 12:16

## 2019-08-24 RX ADMIN — Medication 1 TABLET(S): at 08:35

## 2019-08-24 RX ADMIN — LITHIUM CARBONATE 300 MILLIGRAM(S): 300 TABLET, EXTENDED RELEASE ORAL at 20:18

## 2019-08-24 RX ADMIN — Medication 650 MILLIGRAM(S): at 15:56

## 2019-08-24 RX ADMIN — QUETIAPINE FUMARATE 75 MILLIGRAM(S): 200 TABLET, FILM COATED ORAL at 20:19

## 2019-08-24 RX ADMIN — Medication 100 MILLIGRAM(S): at 20:18

## 2019-08-24 RX ADMIN — LITHIUM CARBONATE 300 MILLIGRAM(S): 300 TABLET, EXTENDED RELEASE ORAL at 08:35

## 2019-08-24 RX ADMIN — SENNA PLUS 1 TABLET(S): 8.6 TABLET ORAL at 08:35

## 2019-08-24 RX ADMIN — Medication 0.5 MILLIGRAM(S): at 08:35

## 2019-08-24 RX ADMIN — ATORVASTATIN CALCIUM 20 MILLIGRAM(S): 80 TABLET, FILM COATED ORAL at 20:18

## 2019-08-24 RX ADMIN — Medication 5 MILLIGRAM(S): at 23:17

## 2019-08-24 RX ADMIN — Medication 100 MILLIGRAM(S): at 08:34

## 2019-08-24 RX ADMIN — Medication 650 MILLIGRAM(S): at 18:20

## 2019-08-24 RX ADMIN — QUETIAPINE FUMARATE 25 MILLIGRAM(S): 200 TABLET, FILM COATED ORAL at 11:43

## 2019-08-24 RX ADMIN — Medication 150 MICROGRAM(S): at 08:35

## 2019-08-24 RX ADMIN — Medication 0.5 MILLIGRAM(S): at 20:19

## 2019-08-24 RX ADMIN — INSULIN GLARGINE 10 UNIT(S): 100 INJECTION, SOLUTION SUBCUTANEOUS at 08:34

## 2019-08-25 LAB
GLUCOSE BLDC GLUCOMTR-MCNC: 103 MG/DL — HIGH (ref 70–99)
GLUCOSE BLDC GLUCOMTR-MCNC: 153 MG/DL — HIGH (ref 70–99)
GLUCOSE BLDC GLUCOMTR-MCNC: 167 MG/DL — HIGH (ref 70–99)
GLUCOSE BLDC GLUCOMTR-MCNC: 195 MG/DL — HIGH (ref 70–99)

## 2019-08-25 PROCEDURE — 99232 SBSQ HOSP IP/OBS MODERATE 35: CPT

## 2019-08-25 RX ORDER — QUETIAPINE FUMARATE 200 MG/1
25 TABLET, FILM COATED ORAL DAILY
Refills: 0 | Status: DISCONTINUED | OUTPATIENT
Start: 2019-08-25 | End: 2019-08-28

## 2019-08-25 RX ORDER — LITHIUM CARBONATE 300 MG/1
300 TABLET, EXTENDED RELEASE ORAL
Refills: 0 | Status: DISCONTINUED | OUTPATIENT
Start: 2019-08-25 | End: 2019-08-26

## 2019-08-25 RX ADMIN — QUETIAPINE FUMARATE 25 MILLIGRAM(S): 200 TABLET, FILM COATED ORAL at 08:44

## 2019-08-25 RX ADMIN — Medication 650 MILLIGRAM(S): at 08:44

## 2019-08-25 RX ADMIN — Medication 0.5 MILLIGRAM(S): at 08:44

## 2019-08-25 RX ADMIN — Medication 1 TABLET(S): at 08:44

## 2019-08-25 RX ADMIN — SENNA PLUS 1 TABLET(S): 8.6 TABLET ORAL at 20:32

## 2019-08-25 RX ADMIN — INSULIN GLARGINE 50 UNIT(S): 100 INJECTION, SOLUTION SUBCUTANEOUS at 21:32

## 2019-08-25 RX ADMIN — Medication 650 MILLIGRAM(S): at 16:43

## 2019-08-25 RX ADMIN — Medication 650 MILLIGRAM(S): at 17:54

## 2019-08-25 RX ADMIN — INSULIN GLARGINE 10 UNIT(S): 100 INJECTION, SOLUTION SUBCUTANEOUS at 08:43

## 2019-08-25 RX ADMIN — Medication 150 MICROGRAM(S): at 08:44

## 2019-08-25 RX ADMIN — Medication 100 MILLIGRAM(S): at 20:32

## 2019-08-25 RX ADMIN — Medication 650 MILLIGRAM(S): at 02:15

## 2019-08-25 RX ADMIN — LITHIUM CARBONATE 300 MILLIGRAM(S): 300 TABLET, EXTENDED RELEASE ORAL at 08:44

## 2019-08-25 RX ADMIN — Medication 100 MILLIGRAM(S): at 08:43

## 2019-08-25 RX ADMIN — Medication 0.5 MILLIGRAM(S): at 20:32

## 2019-08-25 RX ADMIN — SENNA PLUS 1 TABLET(S): 8.6 TABLET ORAL at 08:44

## 2019-08-25 RX ADMIN — LITHIUM CARBONATE 300 MILLIGRAM(S): 300 TABLET, EXTENDED RELEASE ORAL at 20:32

## 2019-08-25 RX ADMIN — ATORVASTATIN CALCIUM 20 MILLIGRAM(S): 80 TABLET, FILM COATED ORAL at 20:32

## 2019-08-25 RX ADMIN — Medication 650 MILLIGRAM(S): at 03:52

## 2019-08-25 RX ADMIN — QUETIAPINE FUMARATE 25 MILLIGRAM(S): 200 TABLET, FILM COATED ORAL at 16:43

## 2019-08-25 RX ADMIN — Medication 0: at 21:22

## 2019-08-25 RX ADMIN — Medication 2: at 12:39

## 2019-08-25 RX ADMIN — QUETIAPINE FUMARATE 100 MILLIGRAM(S): 200 TABLET, FILM COATED ORAL at 20:32

## 2019-08-25 RX ADMIN — Medication 2: at 17:20

## 2019-08-25 RX ADMIN — Medication 650 MILLIGRAM(S): at 09:54

## 2019-08-26 LAB
GLUCOSE BLDC GLUCOMTR-MCNC: 128 MG/DL — HIGH (ref 70–99)
GLUCOSE BLDC GLUCOMTR-MCNC: 135 MG/DL — HIGH (ref 70–99)
GLUCOSE BLDC GLUCOMTR-MCNC: 165 MG/DL — HIGH (ref 70–99)
GLUCOSE BLDC GLUCOMTR-MCNC: 265 MG/DL — HIGH (ref 70–99)
LITHIUM SERPL-MCNC: 0.51 MMOL/L — LOW (ref 0.6–1.2)

## 2019-08-26 PROCEDURE — 90853 GROUP PSYCHOTHERAPY: CPT

## 2019-08-26 PROCEDURE — 99232 SBSQ HOSP IP/OBS MODERATE 35: CPT

## 2019-08-26 PROCEDURE — 90832 PSYTX W PT 30 MINUTES: CPT | Mod: 59

## 2019-08-26 RX ORDER — LITHIUM CARBONATE 300 MG/1
450 TABLET, EXTENDED RELEASE ORAL
Refills: 0 | Status: DISCONTINUED | OUTPATIENT
Start: 2019-08-26 | End: 2019-08-30

## 2019-08-26 RX ORDER — CHLORHEXIDINE GLUCONATE 213 G/1000ML
15 SOLUTION TOPICAL
Refills: 0 | Status: DISCONTINUED | OUTPATIENT
Start: 2019-08-26 | End: 2019-08-30

## 2019-08-26 RX ORDER — QUETIAPINE FUMARATE 200 MG/1
150 TABLET, FILM COATED ORAL AT BEDTIME
Refills: 0 | Status: DISCONTINUED | OUTPATIENT
Start: 2019-08-26 | End: 2019-08-27

## 2019-08-26 RX ORDER — IBUPROFEN 200 MG
400 TABLET ORAL ONCE
Refills: 0 | Status: COMPLETED | OUTPATIENT
Start: 2019-08-26 | End: 2019-08-26

## 2019-08-26 RX ADMIN — Medication 0.5 MILLIGRAM(S): at 08:38

## 2019-08-26 RX ADMIN — Medication 1 TABLET(S): at 08:38

## 2019-08-26 RX ADMIN — Medication 400 MILLIGRAM(S): at 13:25

## 2019-08-26 RX ADMIN — SENNA PLUS 1 TABLET(S): 8.6 TABLET ORAL at 08:38

## 2019-08-26 RX ADMIN — ATORVASTATIN CALCIUM 20 MILLIGRAM(S): 80 TABLET, FILM COATED ORAL at 21:21

## 2019-08-26 RX ADMIN — Medication 2: at 12:19

## 2019-08-26 RX ADMIN — SENNA PLUS 1 TABLET(S): 8.6 TABLET ORAL at 21:21

## 2019-08-26 RX ADMIN — Medication 400 MILLIGRAM(S): at 14:13

## 2019-08-26 RX ADMIN — INSULIN GLARGINE 50 UNIT(S): 100 INJECTION, SOLUTION SUBCUTANEOUS at 21:23

## 2019-08-26 RX ADMIN — INSULIN GLARGINE 10 UNIT(S): 100 INJECTION, SOLUTION SUBCUTANEOUS at 08:28

## 2019-08-26 RX ADMIN — QUETIAPINE FUMARATE 25 MILLIGRAM(S): 200 TABLET, FILM COATED ORAL at 08:38

## 2019-08-26 RX ADMIN — Medication 400 MILLIGRAM(S): at 06:35

## 2019-08-26 RX ADMIN — Medication 400 MILLIGRAM(S): at 13:33

## 2019-08-26 RX ADMIN — Medication 100 MILLIGRAM(S): at 08:39

## 2019-08-26 RX ADMIN — Medication 400 MILLIGRAM(S): at 04:47

## 2019-08-26 RX ADMIN — LITHIUM CARBONATE 450 MILLIGRAM(S): 300 TABLET, EXTENDED RELEASE ORAL at 21:21

## 2019-08-26 RX ADMIN — Medication 1 DROP(S): at 04:30

## 2019-08-26 RX ADMIN — Medication 650 MILLIGRAM(S): at 22:58

## 2019-08-26 RX ADMIN — QUETIAPINE FUMARATE 150 MILLIGRAM(S): 200 TABLET, FILM COATED ORAL at 21:20

## 2019-08-26 RX ADMIN — LITHIUM CARBONATE 300 MILLIGRAM(S): 300 TABLET, EXTENDED RELEASE ORAL at 08:38

## 2019-08-26 RX ADMIN — Medication 150 MICROGRAM(S): at 07:32

## 2019-08-26 RX ADMIN — Medication 100 MILLIGRAM(S): at 21:21

## 2019-08-26 RX ADMIN — Medication 5 MILLIGRAM(S): at 00:10

## 2019-08-26 RX ADMIN — Medication 1: at 21:24

## 2019-08-26 RX ADMIN — Medication 650 MILLIGRAM(S): at 21:58

## 2019-08-26 RX ADMIN — Medication 0.5 MILLIGRAM(S): at 21:20

## 2019-08-27 LAB
GLUCOSE BLDC GLUCOMTR-MCNC: 124 MG/DL — HIGH (ref 70–99)
GLUCOSE BLDC GLUCOMTR-MCNC: 136 MG/DL — HIGH (ref 70–99)
GLUCOSE BLDC GLUCOMTR-MCNC: 219 MG/DL — HIGH (ref 70–99)
GLUCOSE BLDC GLUCOMTR-MCNC: 256 MG/DL — HIGH (ref 70–99)

## 2019-08-27 PROCEDURE — 99232 SBSQ HOSP IP/OBS MODERATE 35: CPT

## 2019-08-27 RX ORDER — HYDROXYZINE HCL 10 MG
25 TABLET ORAL ONCE
Refills: 0 | Status: COMPLETED | OUTPATIENT
Start: 2019-08-27 | End: 2019-08-27

## 2019-08-27 RX ORDER — QUETIAPINE FUMARATE 200 MG/1
200 TABLET, FILM COATED ORAL AT BEDTIME
Refills: 0 | Status: DISCONTINUED | OUTPATIENT
Start: 2019-08-27 | End: 2019-08-28

## 2019-08-27 RX ADMIN — QUETIAPINE FUMARATE 25 MILLIGRAM(S): 200 TABLET, FILM COATED ORAL at 22:07

## 2019-08-27 RX ADMIN — INSULIN GLARGINE 10 UNIT(S): 100 INJECTION, SOLUTION SUBCUTANEOUS at 08:56

## 2019-08-27 RX ADMIN — QUETIAPINE FUMARATE 25 MILLIGRAM(S): 200 TABLET, FILM COATED ORAL at 09:14

## 2019-08-27 RX ADMIN — Medication 1 DROP(S): at 00:30

## 2019-08-27 RX ADMIN — Medication 6: at 12:01

## 2019-08-27 RX ADMIN — QUETIAPINE FUMARATE 200 MILLIGRAM(S): 200 TABLET, FILM COATED ORAL at 20:32

## 2019-08-27 RX ADMIN — QUETIAPINE FUMARATE 25 MILLIGRAM(S): 200 TABLET, FILM COATED ORAL at 15:20

## 2019-08-27 RX ADMIN — ATORVASTATIN CALCIUM 20 MILLIGRAM(S): 80 TABLET, FILM COATED ORAL at 20:32

## 2019-08-27 RX ADMIN — SENNA PLUS 1 TABLET(S): 8.6 TABLET ORAL at 09:15

## 2019-08-27 RX ADMIN — Medication 0.5 MILLIGRAM(S): at 09:14

## 2019-08-27 RX ADMIN — INSULIN GLARGINE 50 UNIT(S): 100 INJECTION, SOLUTION SUBCUTANEOUS at 21:16

## 2019-08-27 RX ADMIN — Medication 0.5 MILLIGRAM(S): at 20:32

## 2019-08-27 RX ADMIN — Medication 1 DROP(S): at 23:45

## 2019-08-27 RX ADMIN — LITHIUM CARBONATE 450 MILLIGRAM(S): 300 TABLET, EXTENDED RELEASE ORAL at 20:32

## 2019-08-27 RX ADMIN — Medication 100 MILLIGRAM(S): at 20:32

## 2019-08-27 RX ADMIN — Medication 150 MICROGRAM(S): at 09:14

## 2019-08-27 RX ADMIN — LITHIUM CARBONATE 450 MILLIGRAM(S): 300 TABLET, EXTENDED RELEASE ORAL at 09:14

## 2019-08-27 RX ADMIN — Medication 5 MILLIGRAM(S): at 23:45

## 2019-08-27 RX ADMIN — SENNA PLUS 1 TABLET(S): 8.6 TABLET ORAL at 20:32

## 2019-08-27 RX ADMIN — Medication 100 MILLIGRAM(S): at 09:14

## 2019-08-27 RX ADMIN — Medication 1 TABLET(S): at 09:14

## 2019-08-27 RX ADMIN — Medication 25 MILLIGRAM(S): at 22:07

## 2019-08-28 LAB
GLUCOSE BLDC GLUCOMTR-MCNC: 192 MG/DL — HIGH (ref 70–99)
GLUCOSE BLDC GLUCOMTR-MCNC: 197 MG/DL — HIGH (ref 70–99)
GLUCOSE BLDC GLUCOMTR-MCNC: 203 MG/DL — HIGH (ref 70–99)
GLUCOSE BLDC GLUCOMTR-MCNC: 256 MG/DL — HIGH (ref 70–99)

## 2019-08-28 PROCEDURE — 90853 GROUP PSYCHOTHERAPY: CPT

## 2019-08-28 PROCEDURE — 99232 SBSQ HOSP IP/OBS MODERATE 35: CPT

## 2019-08-28 RX ORDER — QUETIAPINE FUMARATE 200 MG/1
50 TABLET, FILM COATED ORAL DAILY
Refills: 0 | Status: DISCONTINUED | OUTPATIENT
Start: 2019-08-28 | End: 2019-08-30

## 2019-08-28 RX ORDER — QUETIAPINE FUMARATE 200 MG/1
250 TABLET, FILM COATED ORAL AT BEDTIME
Refills: 0 | Status: DISCONTINUED | OUTPATIENT
Start: 2019-08-28 | End: 2019-08-29

## 2019-08-28 RX ADMIN — Medication 1 TABLET(S): at 08:26

## 2019-08-28 RX ADMIN — Medication 100 MILLIGRAM(S): at 20:33

## 2019-08-28 RX ADMIN — Medication 0.5 MILLIGRAM(S): at 20:32

## 2019-08-28 RX ADMIN — ATORVASTATIN CALCIUM 20 MILLIGRAM(S): 80 TABLET, FILM COATED ORAL at 20:33

## 2019-08-28 RX ADMIN — QUETIAPINE FUMARATE 25 MILLIGRAM(S): 200 TABLET, FILM COATED ORAL at 08:26

## 2019-08-28 RX ADMIN — Medication 100 MILLIGRAM(S): at 08:26

## 2019-08-28 RX ADMIN — Medication 0.5 MILLIGRAM(S): at 01:48

## 2019-08-28 RX ADMIN — LITHIUM CARBONATE 450 MILLIGRAM(S): 300 TABLET, EXTENDED RELEASE ORAL at 08:26

## 2019-08-28 RX ADMIN — Medication 650 MILLIGRAM(S): at 08:30

## 2019-08-28 RX ADMIN — Medication 150 MICROGRAM(S): at 08:26

## 2019-08-28 RX ADMIN — QUETIAPINE FUMARATE 25 MILLIGRAM(S): 200 TABLET, FILM COATED ORAL at 14:14

## 2019-08-28 RX ADMIN — Medication 5 MILLIGRAM(S): at 23:18

## 2019-08-28 RX ADMIN — SENNA PLUS 1 TABLET(S): 8.6 TABLET ORAL at 20:33

## 2019-08-28 RX ADMIN — Medication 0.5 MILLIGRAM(S): at 08:19

## 2019-08-28 RX ADMIN — Medication 2: at 12:22

## 2019-08-28 RX ADMIN — Medication 6: at 08:26

## 2019-08-28 RX ADMIN — Medication 650 MILLIGRAM(S): at 23:18

## 2019-08-28 RX ADMIN — SENNA PLUS 1 TABLET(S): 8.6 TABLET ORAL at 08:26

## 2019-08-28 RX ADMIN — Medication 650 MILLIGRAM(S): at 09:06

## 2019-08-28 RX ADMIN — LITHIUM CARBONATE 450 MILLIGRAM(S): 300 TABLET, EXTENDED RELEASE ORAL at 20:32

## 2019-08-28 RX ADMIN — Medication 4: at 17:14

## 2019-08-28 RX ADMIN — QUETIAPINE FUMARATE 250 MILLIGRAM(S): 200 TABLET, FILM COATED ORAL at 20:32

## 2019-08-28 RX ADMIN — INSULIN GLARGINE 50 UNIT(S): 100 INJECTION, SOLUTION SUBCUTANEOUS at 21:32

## 2019-08-28 RX ADMIN — INSULIN GLARGINE 10 UNIT(S): 100 INJECTION, SOLUTION SUBCUTANEOUS at 08:26

## 2019-08-29 LAB
GLUCOSE BLDC GLUCOMTR-MCNC: 160 MG/DL — HIGH (ref 70–99)
GLUCOSE BLDC GLUCOMTR-MCNC: 202 MG/DL — HIGH (ref 70–99)
GLUCOSE BLDC GLUCOMTR-MCNC: 208 MG/DL — HIGH (ref 70–99)
GLUCOSE BLDC GLUCOMTR-MCNC: 215 MG/DL — HIGH (ref 70–99)
GLUCOSE BLDC GLUCOMTR-MCNC: 230 MG/DL — HIGH (ref 70–99)

## 2019-08-29 RX ORDER — SENNA PLUS 8.6 MG/1
1 TABLET ORAL
Qty: 28 | Refills: 0
Start: 2019-08-29 | End: 2019-09-11

## 2019-08-29 RX ORDER — HYDROXYZINE HCL 10 MG
25 TABLET ORAL ONCE
Refills: 0 | Status: DISCONTINUED | OUTPATIENT
Start: 2019-08-29 | End: 2019-08-29

## 2019-08-29 RX ORDER — DOCUSATE SODIUM 100 MG
1 CAPSULE ORAL
Qty: 28 | Refills: 0
Start: 2019-08-29 | End: 2019-09-11

## 2019-08-29 RX ORDER — QUETIAPINE FUMARATE 200 MG/1
1 TABLET, FILM COATED ORAL
Qty: 14 | Refills: 0
Start: 2019-08-29 | End: 2019-09-11

## 2019-08-29 RX ORDER — MULTIVIT-MIN/FERROUS GLUCONATE 9 MG/15 ML
1 LIQUID (ML) ORAL
Qty: 0 | Refills: 0 | DISCHARGE

## 2019-08-29 RX ORDER — LEVOTHYROXINE SODIUM 125 MCG
1 TABLET ORAL
Qty: 14 | Refills: 0
Start: 2019-08-29 | End: 2019-09-11

## 2019-08-29 RX ORDER — ENOXAPARIN SODIUM 100 MG/ML
10 INJECTION SUBCUTANEOUS
Qty: 1 | Refills: 0
Start: 2019-08-29

## 2019-08-29 RX ORDER — ATORVASTATIN CALCIUM 80 MG/1
1 TABLET, FILM COATED ORAL
Qty: 14 | Refills: 0
Start: 2019-08-29 | End: 2019-09-11

## 2019-08-29 RX ORDER — QUETIAPINE FUMARATE 200 MG/1
300 TABLET, FILM COATED ORAL AT BEDTIME
Refills: 0 | Status: DISCONTINUED | OUTPATIENT
Start: 2019-08-29 | End: 2019-08-30

## 2019-08-29 RX ORDER — LITHIUM CARBONATE 300 MG/1
1 TABLET, EXTENDED RELEASE ORAL
Qty: 28 | Refills: 0
Start: 2019-08-29 | End: 2019-09-11

## 2019-08-29 RX ADMIN — Medication 0: at 21:30

## 2019-08-29 RX ADMIN — Medication 5 MILLIGRAM(S): at 20:58

## 2019-08-29 RX ADMIN — Medication 650 MILLIGRAM(S): at 23:21

## 2019-08-29 RX ADMIN — Medication 650 MILLIGRAM(S): at 00:20

## 2019-08-29 RX ADMIN — QUETIAPINE FUMARATE 25 MILLIGRAM(S): 200 TABLET, FILM COATED ORAL at 09:50

## 2019-08-29 RX ADMIN — Medication 150 MICROGRAM(S): at 09:09

## 2019-08-29 RX ADMIN — ATORVASTATIN CALCIUM 20 MILLIGRAM(S): 80 TABLET, FILM COATED ORAL at 20:58

## 2019-08-29 RX ADMIN — QUETIAPINE FUMARATE 50 MILLIGRAM(S): 200 TABLET, FILM COATED ORAL at 09:09

## 2019-08-29 RX ADMIN — Medication 650 MILLIGRAM(S): at 20:57

## 2019-08-29 RX ADMIN — Medication 0.5 MILLIGRAM(S): at 20:58

## 2019-08-29 RX ADMIN — INSULIN GLARGINE 10 UNIT(S): 100 INJECTION, SOLUTION SUBCUTANEOUS at 08:36

## 2019-08-29 RX ADMIN — Medication 0.5 MILLIGRAM(S): at 09:09

## 2019-08-29 RX ADMIN — Medication 2: at 08:35

## 2019-08-29 RX ADMIN — Medication 100 MILLIGRAM(S): at 09:09

## 2019-08-29 RX ADMIN — QUETIAPINE FUMARATE 25 MILLIGRAM(S): 200 TABLET, FILM COATED ORAL at 17:15

## 2019-08-29 RX ADMIN — Medication 4: at 17:13

## 2019-08-29 RX ADMIN — SENNA PLUS 1 TABLET(S): 8.6 TABLET ORAL at 09:09

## 2019-08-29 RX ADMIN — CHLORHEXIDINE GLUCONATE 15 MILLILITER(S): 213 SOLUTION TOPICAL at 18:25

## 2019-08-29 RX ADMIN — QUETIAPINE FUMARATE 300 MILLIGRAM(S): 200 TABLET, FILM COATED ORAL at 20:57

## 2019-08-29 RX ADMIN — Medication 4: at 12:09

## 2019-08-29 RX ADMIN — INSULIN GLARGINE 50 UNIT(S): 100 INJECTION, SOLUTION SUBCUTANEOUS at 21:34

## 2019-08-29 RX ADMIN — SENNA PLUS 1 TABLET(S): 8.6 TABLET ORAL at 20:57

## 2019-08-29 RX ADMIN — Medication 1 TABLET(S): at 09:09

## 2019-08-29 RX ADMIN — LITHIUM CARBONATE 450 MILLIGRAM(S): 300 TABLET, EXTENDED RELEASE ORAL at 09:09

## 2019-08-29 RX ADMIN — Medication 100 MILLIGRAM(S): at 20:58

## 2019-08-29 RX ADMIN — LITHIUM CARBONATE 450 MILLIGRAM(S): 300 TABLET, EXTENDED RELEASE ORAL at 20:58

## 2019-08-30 VITALS — TEMPERATURE: 98 F

## 2019-08-30 LAB — GLUCOSE BLDC GLUCOMTR-MCNC: 180 MG/DL — HIGH (ref 70–99)

## 2019-08-30 PROCEDURE — 99238 HOSP IP/OBS DSCHRG MGMT 30/<: CPT

## 2019-08-30 RX ADMIN — Medication 2: at 08:39

## 2019-08-30 RX ADMIN — SENNA PLUS 1 TABLET(S): 8.6 TABLET ORAL at 08:17

## 2019-08-30 RX ADMIN — Medication 1 TABLET(S): at 08:17

## 2019-08-30 RX ADMIN — Medication 150 MICROGRAM(S): at 08:17

## 2019-08-30 RX ADMIN — QUETIAPINE FUMARATE 50 MILLIGRAM(S): 200 TABLET, FILM COATED ORAL at 08:17

## 2019-08-30 RX ADMIN — Medication 1 DROP(S): at 01:22

## 2019-08-30 RX ADMIN — Medication 100 MILLIGRAM(S): at 08:17

## 2019-08-30 RX ADMIN — Medication 0.5 MILLIGRAM(S): at 08:17

## 2019-08-30 RX ADMIN — QUETIAPINE FUMARATE 25 MILLIGRAM(S): 200 TABLET, FILM COATED ORAL at 01:51

## 2019-08-30 RX ADMIN — LITHIUM CARBONATE 450 MILLIGRAM(S): 300 TABLET, EXTENDED RELEASE ORAL at 08:17

## 2019-08-30 RX ADMIN — INSULIN GLARGINE 10 UNIT(S): 100 INJECTION, SOLUTION SUBCUTANEOUS at 08:39

## 2019-09-25 ENCOUNTER — APPOINTMENT (OUTPATIENT)
Dept: PULMONOLOGY | Facility: CLINIC | Age: 69
End: 2019-09-25
Payer: MEDICARE

## 2019-09-25 VITALS
BODY MASS INDEX: 38.57 KG/M2 | SYSTOLIC BLOOD PRESSURE: 120 MMHG | OXYGEN SATURATION: 96 % | DIASTOLIC BLOOD PRESSURE: 60 MMHG | HEIGHT: 66 IN | WEIGHT: 240 LBS | HEART RATE: 65 BPM

## 2019-09-25 DIAGNOSIS — Z87.891 PERSONAL HISTORY OF NICOTINE DEPENDENCE: ICD-10-CM

## 2019-09-25 DIAGNOSIS — Z86.39 PERSONAL HISTORY OF OTHER ENDOCRINE, NUTRITIONAL AND METABOLIC DISEASE: ICD-10-CM

## 2019-09-25 DIAGNOSIS — E10.65 TYPE 1 DIABETES MELLITUS WITH HYPERGLYCEMIA: ICD-10-CM

## 2019-09-25 DIAGNOSIS — F25.0 SCHIZOAFFECTIVE DISORDER, BIPOLAR TYPE: ICD-10-CM

## 2019-09-25 PROCEDURE — 99204 OFFICE O/P NEW MOD 45 MIN: CPT

## 2019-09-25 RX ORDER — ATORVASTATIN CALCIUM 20 MG/1
20 TABLET, FILM COATED ORAL
Qty: 90 | Refills: 0 | Status: ACTIVE | COMMUNITY
Start: 2019-04-22

## 2019-09-25 RX ORDER — TRAZODONE HYDROCHLORIDE 100 MG/1
100 TABLET ORAL
Qty: 30 | Refills: 0 | Status: ACTIVE | COMMUNITY
Start: 2019-09-20

## 2019-09-25 RX ORDER — ALPRAZOLAM 1 MG/1
1 TABLET ORAL
Qty: 90 | Refills: 0 | Status: ACTIVE | COMMUNITY
Start: 2019-04-05

## 2019-09-25 RX ORDER — INSULIN ASPART 100 [IU]/ML
100 INJECTION, SOLUTION INTRAVENOUS; SUBCUTANEOUS
Qty: 54 | Refills: 0 | Status: ACTIVE | COMMUNITY
Start: 2019-07-15

## 2019-09-25 RX ORDER — LITHIUM CARBONATE 450 MG/1
450 TABLET ORAL
Qty: 28 | Refills: 0 | Status: ACTIVE | COMMUNITY
Start: 2019-08-29

## 2019-09-25 RX ORDER — LAMOTRIGINE 25 MG/1
25 TABLET ORAL
Qty: 60 | Refills: 0 | Status: ACTIVE | COMMUNITY
Start: 2019-05-20

## 2019-09-25 RX ORDER — MIRTAZAPINE 30 MG/1
30 TABLET, FILM COATED ORAL
Qty: 30 | Refills: 0 | Status: ACTIVE | COMMUNITY
Start: 2019-07-18

## 2019-09-25 RX ORDER — THYROID, PORCINE 90 MG/1
90 TABLET ORAL
Qty: 90 | Refills: 0 | Status: ACTIVE | COMMUNITY
Start: 2019-07-15

## 2019-09-25 RX ORDER — QUETIAPINE FUMARATE 300 MG/1
300 TABLET ORAL
Qty: 30 | Refills: 0 | Status: ACTIVE | COMMUNITY
Start: 2019-09-09

## 2019-09-25 RX ORDER — MIRTAZAPINE 15 MG/1
15 TABLET, FILM COATED ORAL
Qty: 6 | Refills: 0 | Status: ACTIVE | COMMUNITY
Start: 2019-07-13

## 2019-09-25 RX ORDER — LAMOTRIGINE 150 MG/1
150 TABLET ORAL
Qty: 30 | Refills: 0 | Status: ACTIVE | COMMUNITY
Start: 2019-04-26

## 2019-09-25 RX ORDER — CARIPRAZINE 3 MG/1
3 CAPSULE, GELATIN COATED ORAL
Qty: 30 | Refills: 0 | Status: ACTIVE | COMMUNITY
Start: 2019-09-13

## 2019-09-25 RX ORDER — GABAPENTIN 100 MG/1
100 CAPSULE ORAL
Qty: 90 | Refills: 0 | Status: ACTIVE | COMMUNITY
Start: 2019-07-11

## 2019-09-25 RX ORDER — SERTRALINE HYDROCHLORIDE 50 MG/1
50 TABLET, FILM COATED ORAL
Qty: 30 | Refills: 0 | Status: ACTIVE | COMMUNITY
Start: 2019-07-11

## 2019-09-25 RX ORDER — INSULIN GLARGINE 100 [IU]/ML
100 INJECTION, SOLUTION SUBCUTANEOUS
Qty: 20 | Refills: 0 | Status: ACTIVE | COMMUNITY
Start: 2019-07-11

## 2019-09-25 RX ORDER — PEN NEEDLE, DIABETIC 29 G X1/2"
32G X 4 MM NEEDLE, DISPOSABLE MISCELLANEOUS
Qty: 400 | Refills: 0 | Status: ACTIVE | COMMUNITY
Start: 2019-07-15

## 2019-09-25 RX ORDER — QUETIAPINE FUMARATE 50 MG/1
50 TABLET ORAL
Qty: 14 | Refills: 0 | Status: ACTIVE | COMMUNITY
Start: 2019-08-29

## 2019-09-25 RX ORDER — LITHIUM CARBONATE 150 MG/1
150 CAPSULE ORAL
Qty: 120 | Refills: 0 | Status: ACTIVE | COMMUNITY
Start: 2019-07-11

## 2019-09-25 RX ORDER — LITHIUM CARBONATE 300 MG/1
300 CAPSULE ORAL
Qty: 60 | Refills: 0 | Status: ACTIVE | COMMUNITY
Start: 2019-07-16

## 2019-09-25 RX ORDER — HYDROXYZINE PAMOATE 50 MG/1
50 CAPSULE ORAL
Qty: 60 | Refills: 0 | Status: ACTIVE | COMMUNITY
Start: 2019-05-31

## 2019-09-25 RX ORDER — LEVOTHYROXINE SODIUM 0.15 MG/1
150 TABLET ORAL
Qty: 14 | Refills: 0 | Status: ACTIVE | COMMUNITY
Start: 2019-08-29

## 2019-09-25 RX ORDER — LURASIDONE HYDROCHLORIDE 80 MG/1
80 TABLET, FILM COATED ORAL
Qty: 30 | Refills: 0 | Status: ACTIVE | COMMUNITY
Start: 2019-04-05

## 2019-09-25 RX ORDER — SENNOSIDES 8.6 MG
8.6 TABLET ORAL
Qty: 60 | Refills: 0 | Status: ACTIVE | COMMUNITY
Start: 2019-07-11

## 2019-09-25 RX ORDER — GABAPENTIN 600 MG/1
600 TABLET, COATED ORAL
Qty: 90 | Refills: 0 | Status: ACTIVE | COMMUNITY
Start: 2019-07-11

## 2019-09-25 RX ORDER — LORAZEPAM 0.5 MG/1
0.5 TABLET ORAL
Qty: 60 | Refills: 0 | Status: ACTIVE | COMMUNITY
Start: 2019-09-09

## 2019-09-25 RX ORDER — LAMOTRIGINE 100 MG/1
100 TABLET ORAL
Qty: 30 | Refills: 0 | Status: ACTIVE | COMMUNITY
Start: 2019-05-06

## 2019-09-25 RX ORDER — LAMOTRIGINE 200 MG/1
200 TABLET ORAL
Qty: 30 | Refills: 0 | Status: ACTIVE | COMMUNITY
Start: 2019-04-05

## 2019-09-25 NOTE — HISTORY OF PRESENT ILLNESS
[Snoring] : snoring [Daytime Somnolence] : daytime somnolence [ESS: ___] : ESS score [unfilled] [Unintentional Sleep While Inactive] : unintentional sleep while inactive [Awakes Unrefreshed] : awakening unrefreshed [FreeTextEntry1] : Obese male with H/O FLORECITA untreated for many years\par Poor sleep and anxiety issues\par Tongue issues\par Sleep study advised by ENT\par Patient's daughter (one of our Nurses) suggested he see me to arrange for sleep test.

## 2019-09-25 NOTE — CONSULT LETTER
[Dear  ___] : Dear  [unfilled], [Consult Letter:] : I had the pleasure of evaluating your patient, [unfilled]. [Please see my note below.] : Please see my note below. [Consult Closing:] : Thank you very much for allowing me to participate in the care of this patient.  If you have any questions, please do not hesitate to contact me. [Sincerely,] : Sincerely, [DrShaan  ___] : Dr. MARSH

## 2019-09-25 NOTE — PHYSICAL EXAM
[Normal Conjunctiva] : the conjunctiva exhibited no abnormalities [Elongated Uvula] : elongated uvula [Enlarged Base of the Tongue] : enlargement of the base of the tongue [II] : II [Neck Appearance] : the appearance of the neck was normal [Thyroid Diffuse Enlargement] : the thyroid was not enlarged [Neck Cervical Mass (___cm)] : no neck mass was observed [Neck Circumference: ___] : neck circumference is [unfilled] [Heart Sounds] : normal S1 and S2 [Arterial Pulses Normal] : the arterial pulses were normal [Edema] : no peripheral edema present [Respiration, Rhythm And Depth] : normal respiratory rhythm and effort [Auscultation Breath Sounds / Voice Sounds] : lungs were clear to auscultation bilaterally [Lungs Percussion] : the lungs were normal to percussion [Bowel Sounds] : normal bowel sounds [Abdomen Soft] : soft [Abdomen Tenderness] : non-tender [Abnormal Walk] : normal gait [Nail Clubbing] : no clubbing of the fingernails [Cyanosis, Localized] : no localized cyanosis [] : no rash [Skin Turgor] : normal skin turgor [No Focal Deficits] : no focal deficits [FreeTextEntry1] : obese

## 2019-11-13 ENCOUNTER — MEDICATION RENEWAL (OUTPATIENT)
Age: 69
End: 2019-11-13

## 2019-11-13 DIAGNOSIS — J06.9 ACUTE UPPER RESPIRATORY INFECTION, UNSPECIFIED: ICD-10-CM

## 2019-12-17 ENCOUNTER — APPOINTMENT (OUTPATIENT)
Dept: PULMONOLOGY | Facility: CLINIC | Age: 69
End: 2019-12-17
Payer: MEDICARE

## 2019-12-17 ENCOUNTER — OTHER (OUTPATIENT)
Age: 69
End: 2019-12-17

## 2019-12-17 VITALS
SYSTOLIC BLOOD PRESSURE: 122 MMHG | HEART RATE: 89 BPM | DIASTOLIC BLOOD PRESSURE: 80 MMHG | WEIGHT: 248 LBS | HEIGHT: 66 IN | OXYGEN SATURATION: 96 % | BODY MASS INDEX: 39.86 KG/M2

## 2019-12-17 DIAGNOSIS — G47.33 OBSTRUCTIVE SLEEP APNEA (ADULT) (PEDIATRIC): ICD-10-CM

## 2019-12-17 DIAGNOSIS — E66.9 OBESITY, UNSPECIFIED: ICD-10-CM

## 2019-12-17 PROCEDURE — 99214 OFFICE O/P EST MOD 30 MIN: CPT

## 2019-12-17 RX ORDER — ZOLPIDEM TARTRATE 10 MG/1
10 TABLET ORAL
Qty: 2 | Refills: 0 | Status: ACTIVE | COMMUNITY
Start: 2019-09-25

## 2019-12-17 RX ORDER — AZITHROMYCIN 250 MG/1
250 TABLET, FILM COATED ORAL
Qty: 1 | Refills: 0 | Status: DISCONTINUED | COMMUNITY
Start: 2019-11-13 | End: 2019-12-17

## 2019-12-17 NOTE — HISTORY OF PRESENT ILLNESS
[Snoring] : snoring [Daytime Somnolence] : daytime somnolence [ESS: ___] : ESS score [unfilled] [Unintentional Sleep While Inactive] : unintentional sleep while inactive [Awakes Unrefreshed] : awakening unrefreshed [FreeTextEntry1] : Obese male with H/O FLORECITA untreated for many years\par Poor sleep and anxiety issues\par Tongue issues\par Sleep study advised by ENT\par Patient's daughter (one of our Nurses) suggested he see me to arrange for sleep test. \par \par 12/17/19\par Intolerant to CPAP\par Anxious and claustrophobic

## 2019-12-17 NOTE — PHYSICAL EXAM
[Normal Conjunctiva] : the conjunctiva exhibited no abnormalities [Elongated Uvula] : elongated uvula [Enlarged Base of the Tongue] : enlargement of the base of the tongue [II] : II [Neck Appearance] : the appearance of the neck was normal [Neck Cervical Mass (___cm)] : no neck mass was observed [Thyroid Diffuse Enlargement] : the thyroid was not enlarged [Neck Circumference: ___] : neck circumference is [unfilled] [Arterial Pulses Normal] : the arterial pulses were normal [Heart Sounds] : normal S1 and S2 [Edema] : no peripheral edema present [Respiration, Rhythm And Depth] : normal respiratory rhythm and effort [Auscultation Breath Sounds / Voice Sounds] : lungs were clear to auscultation bilaterally [Lungs Percussion] : the lungs were normal to percussion [Bowel Sounds] : normal bowel sounds [Abdomen Soft] : soft [Abdomen Tenderness] : non-tender [Abnormal Walk] : normal gait [Cyanosis, Localized] : no localized cyanosis [Nail Clubbing] : no clubbing of the fingernails [No Focal Deficits] : no focal deficits [] : no rash [Skin Turgor] : normal skin turgor [FreeTextEntry1] : obese

## 2020-03-17 ENCOUNTER — APPOINTMENT (OUTPATIENT)
Dept: PULMONOLOGY | Facility: CLINIC | Age: 70
End: 2020-03-17

## 2020-12-21 PROBLEM — J06.9 URI, ACUTE: Status: RESOLVED | Noted: 2019-11-13 | Resolved: 2020-12-21

## 2021-08-25 ENCOUNTER — APPOINTMENT (OUTPATIENT)
Dept: PULMONOLOGY | Facility: CLINIC | Age: 71
End: 2021-08-25
Payer: MEDICARE

## 2021-08-25 DIAGNOSIS — U07.1 COVID-19: ICD-10-CM

## 2021-08-25 PROCEDURE — 99212 OFFICE O/P EST SF 10 MIN: CPT | Mod: CS,95

## 2021-08-25 NOTE — HISTORY OF PRESENT ILLNESS
[Home] : at home, [unfilled] , at the time of the visit. [Medical Office: ([facility])___] : at the medical office located in  [Verbal consent obtained from patient] : the patient, [unfilled] [FreeTextEntry1] : call:\par \par daughter\par \par 72y/o  male   ex-smoker (   1 pack  --  18year old  - quit  24 years old   1974  six pack years )       with h/o DM    bipolar disorder   moderate FLORECITA    hyopthyroid \par work :  \par -no covid  vaccine\par -wife  got sick  --    then patient  had fever  one week ago  night time -  cough   n ochest pain \par -urgent care  today     afebrile    rapid    and covid  pcr  pulse ox    90 % \par - \par \par - discussed  risk of  rapid  decompensation due to history of DM and obesity   is high\par \par -patient and daughter agree to go to ED  now for emergent treatment -    likely [facility]  as  wife there

## 2022-02-28 NOTE — CONSULT NOTE ADULT - ASSESSMENT
----- Message from Cesar Jones on behalf of Maureen Millan sent at 2/25/2022  6:23 PM EST -----  Regarding: Brett Moreno   This message is being sent by Cesar Jones on behalf of Maureen Marx    We got home and realized there are no needles  The pharmacist said the needle have to be prescribed  They are called marzena fine or marzena twist needles  68 yo M with bipolar disorder, macular degeneration of L eye, FLORECITA noncompliant with CPAP, DM2 on insulin with mild neuropathy presenting [facility] for worsening anxiety/depression.     # DM2 on insulin:  Overall poor control given HbA1c >8  - c/w lantus 10 and lantus 50 units---theoretically can be given in a single dose of 60 units given that splitting is more common for doses >70-80 units  - c/w BG and MIKKI  - c/w atorvastatin 20 mg QHS    # Macular degeneration  - OP f/u    # FLORECITA: noncompliant with CPAP states keeps the machine in his garage and never uses it  - OP f/u    Depression with need for ECT, pre-procedure evaluation:   ECT specific risk assessment: pt without history of increased ICP, aneurysm, active pulmonary disease, valvular disease, CAD, cerebral vascular disease.     Cardiovascular risk assessment: Patient evaluated using the revised cardiac risk index and is felt to be low cardiovascular risk for a low cardiovascular risk procedure based on these criteria. The risk has been discussed with the patient and the patient wishes to proceed understanding that ECT is a low risk procedure.     Risk for complication is low. Patient is optimized for ECT treatment without further intervention and can proceed with treatment.    Anesthesia specific concerns : no   History of adverse reaction to anesthesia previously: no   Esophageal Assessment: NA  Known Spine issues: no  CKD: no 70 yo M with bipolar disorder, macular degeneration of L eye, FLORECITA noncompliant with CPAP, hypothyroidism, DM2 on insulin with mild neuropathy presenting [facility] for worsening anxiety/depression.     # DM2 on insulin:  Overall poor control given HbA1c >8  - c/w lantus 10 and lantus 50 units---theoretically can be given in a single dose of 60 units given that splitting is more common for doses >70-80 units  - c/w BG and MIKKI  - c/w atorvastatin 20 mg QHS    # Macular degeneration  - OP f/u    # FLORECITA: noncompliant with CPAP states keeps the machine in his garage and never uses it  - OP f/u    # Hypothyroidism  - c/w synthroid    Depression with need for ECT, pre-procedure evaluation:   ECT specific risk assessment: pt without history of increased ICP, aneurysm, active pulmonary disease, valvular disease, CAD, cerebral vascular disease.     Cardiovascular risk assessment: Patient evaluated using the revised cardiac risk index and is felt to be low cardiovascular risk for a low cardiovascular risk procedure based on these criteria. The risk has been discussed with the patient and the patient wishes to proceed understanding that ECT is a low risk procedure.     Risk for complication is low. Patient is optimized for ECT treatment without further intervention and can proceed with treatment.    Anesthesia specific concerns : no   History of adverse reaction to anesthesia previously: no   Esophageal Assessment: NA  Known Spine issues: no  CKD: no

## 2022-11-28 ENCOUNTER — OFFICE (OUTPATIENT)
Dept: URBAN - METROPOLITAN AREA CLINIC 94 | Facility: CLINIC | Age: 72
Setting detail: OPHTHALMOLOGY
End: 2022-11-28
Payer: MEDICARE

## 2022-11-28 DIAGNOSIS — E11.3313: ICD-10-CM

## 2022-11-28 DIAGNOSIS — H35.3221: ICD-10-CM

## 2022-11-28 DIAGNOSIS — H35.373: ICD-10-CM

## 2022-11-28 DIAGNOSIS — H35.3112: ICD-10-CM

## 2022-11-28 DIAGNOSIS — H43.813: ICD-10-CM

## 2022-11-28 PROCEDURE — 92134 CPTRZ OPH DX IMG PST SGM RTA: CPT | Performed by: SPECIALIST

## 2022-11-28 PROCEDURE — 92235 FLUORESCEIN ANGRPH MLTIFRAME: CPT | Performed by: SPECIALIST

## 2022-11-28 PROCEDURE — 67028 INJECTION EYE DRUG: CPT | Performed by: SPECIALIST

## 2022-11-28 ASSESSMENT — REFRACTION_AUTOREFRACTION
OS_SPHERE: +1.50
OD_AXIS: 120
OD_CYLINDER: -1.00
OS_AXIS: 095
OS_CYLINDER: -0.50
OD_SPHERE: +0.75

## 2022-11-28 ASSESSMENT — KERATOMETRY
OS_K1POWER_DIOPTERS: 43.83
OD_K2POWER_DIOPTERS: 44.41
OS_K2POWER_DIOPTERS: 45.06
OD_AXISANGLE_DEGREES: 78
OD_K1POWER_DIOPTERS: 43.95
OS_AXISANGLE_DEGREES: 145

## 2022-11-28 ASSESSMENT — LID EXAM ASSESSMENTS
OS_BLEPHARITIS: LLL LUL 2+ 3+
OD_BLEPHARITIS: RLL RUL 2+ 3+

## 2022-11-28 ASSESSMENT — SPHEQUIV_DERIVED
OD_SPHEQUIV: 0.25
OS_SPHEQUIV: 1.25

## 2022-11-28 ASSESSMENT — AXIALLENGTH_DERIVED
OS_AL: 22.7863
OD_AL: 23.2498

## 2022-11-28 ASSESSMENT — SUPERFICIAL PUNCTATE KERATITIS (SPK)
OD_SPK: 2+
OS_SPK: 3+

## 2022-11-28 ASSESSMENT — TONOMETRY
OS_IOP_MMHG: 18
OD_IOP_MMHG: 19

## 2022-11-28 ASSESSMENT — VISUAL ACUITY
OD_BCVA: 20/40+1
OS_BCVA: 20/25-1

## 2022-12-12 ENCOUNTER — OFFICE (OUTPATIENT)
Dept: URBAN - METROPOLITAN AREA CLINIC 94 | Facility: CLINIC | Age: 72
Setting detail: OPHTHALMOLOGY
End: 2022-12-12
Payer: MEDICARE

## 2022-12-12 ENCOUNTER — ASC (OUTPATIENT)
Dept: URBAN - METROPOLITAN AREA SURGERY 8 | Facility: SURGERY | Age: 72
Setting detail: OPHTHALMOLOGY
End: 2022-12-12
Payer: MEDICARE

## 2022-12-12 DIAGNOSIS — E11.3313: ICD-10-CM

## 2022-12-12 DIAGNOSIS — H35.3112: ICD-10-CM

## 2022-12-12 DIAGNOSIS — H35.3221: ICD-10-CM

## 2022-12-12 DIAGNOSIS — E11.3311: ICD-10-CM

## 2022-12-12 DIAGNOSIS — H35.373: ICD-10-CM

## 2022-12-12 PROCEDURE — 67210 TREATMENT OF RETINAL LESION: CPT | Performed by: SPECIALIST

## 2022-12-12 PROCEDURE — 92134 CPTRZ OPH DX IMG PST SGM RTA: CPT | Performed by: SPECIALIST

## 2022-12-12 ASSESSMENT — LID EXAM ASSESSMENTS
OS_BLEPHARITIS: LLL LUL 2+ 3+
OD_BLEPHARITIS: RLL RUL 2+ 3+

## 2022-12-12 ASSESSMENT — KERATOMETRY
OS_K2POWER_DIOPTERS: 45.06
OD_K2POWER_DIOPTERS: 44.41
OD_AXISANGLE_DEGREES: 78
OS_K1POWER_DIOPTERS: 43.83
OS_AXISANGLE_DEGREES: 145
OD_K1POWER_DIOPTERS: 43.95

## 2022-12-12 ASSESSMENT — REFRACTION_AUTOREFRACTION
OS_CYLINDER: -0.50
OS_AXIS: 095
OS_SPHERE: +1.50
OD_AXIS: 120
OD_CYLINDER: -1.00
OD_SPHERE: +0.75

## 2022-12-12 ASSESSMENT — AXIALLENGTH_DERIVED
OS_AL: 22.7863
OD_AL: 23.2498

## 2022-12-12 ASSESSMENT — TONOMETRY: OD_IOP_MMHG: 17

## 2022-12-12 ASSESSMENT — SPHEQUIV_DERIVED
OD_SPHEQUIV: 0.25
OS_SPHEQUIV: 1.25

## 2022-12-12 ASSESSMENT — SUPERFICIAL PUNCTATE KERATITIS (SPK)
OS_SPK: 3+
OD_SPK: 2+

## 2022-12-12 ASSESSMENT — CONFRONTATIONAL VISUAL FIELD TEST (CVF)
OD_FINDINGS: FULL
OS_FINDINGS: FULL

## 2022-12-12 ASSESSMENT — VISUAL ACUITY
OD_BCVA: 20/40
OS_BCVA: 20/30

## 2023-01-23 ENCOUNTER — OFFICE (OUTPATIENT)
Dept: URBAN - METROPOLITAN AREA CLINIC 94 | Facility: CLINIC | Age: 73
Setting detail: OPHTHALMOLOGY
End: 2023-01-23
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
## 2023-01-23 PROCEDURE — NO SHOW FE NO SHOW FEE: Performed by: SPECIALIST

## 2023-03-27 ENCOUNTER — OFFICE (OUTPATIENT)
Dept: URBAN - METROPOLITAN AREA CLINIC 94 | Facility: CLINIC | Age: 73
Setting detail: OPHTHALMOLOGY
End: 2023-03-27
Payer: MEDICARE

## 2023-03-27 ENCOUNTER — ASC (OUTPATIENT)
Dept: URBAN - METROPOLITAN AREA SURGERY 8 | Facility: SURGERY | Age: 73
Setting detail: OPHTHALMOLOGY
End: 2023-03-27
Payer: MEDICARE

## 2023-03-27 DIAGNOSIS — H35.373: ICD-10-CM

## 2023-03-27 DIAGNOSIS — E11.3312: ICD-10-CM

## 2023-03-27 DIAGNOSIS — H35.3221: ICD-10-CM

## 2023-03-27 DIAGNOSIS — E11.3313: ICD-10-CM

## 2023-03-27 DIAGNOSIS — H35.3112: ICD-10-CM

## 2023-03-27 DIAGNOSIS — H43.813: ICD-10-CM

## 2023-03-27 PROCEDURE — 92012 INTRM OPH EXAM EST PATIENT: CPT | Performed by: SPECIALIST

## 2023-03-27 PROCEDURE — 92134 CPTRZ OPH DX IMG PST SGM RTA: CPT | Performed by: SPECIALIST

## 2023-03-27 PROCEDURE — 67210 TREATMENT OF RETINAL LESION: CPT | Performed by: SPECIALIST

## 2023-03-27 ASSESSMENT — VISUAL ACUITY
OD_BCVA: 20/50-1
OS_BCVA: 20/40-1

## 2023-03-27 ASSESSMENT — KERATOMETRY
OD_AXISANGLE_DEGREES: 78
OD_K1POWER_DIOPTERS: 43.95
OD_K2POWER_DIOPTERS: 44.41
OS_K1POWER_DIOPTERS: 43.83
OS_AXISANGLE_DEGREES: 145
OS_K2POWER_DIOPTERS: 45.06

## 2023-03-27 ASSESSMENT — LID EXAM ASSESSMENTS
OD_BLEPHARITIS: RLL RUL 2+ 3+
OS_BLEPHARITIS: LLL LUL 2+ 3+

## 2023-03-27 ASSESSMENT — SUPERFICIAL PUNCTATE KERATITIS (SPK)
OD_SPK: 2+
OS_SPK: 3+

## 2023-03-27 ASSESSMENT — AXIALLENGTH_DERIVED
OS_AL: 22.7863
OD_AL: 23.2498

## 2023-03-27 ASSESSMENT — REFRACTION_AUTOREFRACTION
OD_SPHERE: +0.75
OS_CYLINDER: -0.50
OS_AXIS: 095
OD_CYLINDER: -1.00
OS_SPHERE: +1.50
OD_AXIS: 120

## 2023-03-27 ASSESSMENT — CONFRONTATIONAL VISUAL FIELD TEST (CVF)
OS_FINDINGS: FULL
OD_FINDINGS: FULL

## 2023-03-27 ASSESSMENT — TONOMETRY
OD_IOP_MMHG: 17
OS_IOP_MMHG: 18

## 2023-03-27 ASSESSMENT — SPHEQUIV_DERIVED
OS_SPHEQUIV: 1.25
OD_SPHEQUIV: 0.25

## 2023-05-18 ENCOUNTER — OFFICE (OUTPATIENT)
Dept: URBAN - METROPOLITAN AREA CLINIC 94 | Facility: CLINIC | Age: 73
Setting detail: OPHTHALMOLOGY
End: 2023-05-18
Payer: MEDICARE

## 2023-05-18 DIAGNOSIS — H35.3221: ICD-10-CM

## 2023-05-18 DIAGNOSIS — H35.3112: ICD-10-CM

## 2023-05-18 DIAGNOSIS — H43.813: ICD-10-CM

## 2023-05-18 DIAGNOSIS — H35.373: ICD-10-CM

## 2023-05-18 DIAGNOSIS — E11.3313: ICD-10-CM

## 2023-05-18 PROCEDURE — 99024 POSTOP FOLLOW-UP VISIT: CPT | Performed by: SPECIALIST

## 2023-05-18 PROCEDURE — 92134 CPTRZ OPH DX IMG PST SGM RTA: CPT | Performed by: SPECIALIST

## 2023-05-18 ASSESSMENT — KERATOMETRY
OS_K1POWER_DIOPTERS: 43.83
OS_AXISANGLE_DEGREES: 145
OD_K1POWER_DIOPTERS: 43.95
OS_K2POWER_DIOPTERS: 45.06
OD_AXISANGLE_DEGREES: 78
OD_K2POWER_DIOPTERS: 44.41

## 2023-05-18 ASSESSMENT — SPHEQUIV_DERIVED
OS_SPHEQUIV: 1.25
OD_SPHEQUIV: 0.25

## 2023-05-18 ASSESSMENT — REFRACTION_AUTOREFRACTION
OD_AXIS: 120
OD_CYLINDER: -1.00
OS_SPHERE: +1.50
OD_SPHERE: +0.75
OS_CYLINDER: -0.50
OS_AXIS: 095

## 2023-05-18 ASSESSMENT — LID EXAM ASSESSMENTS
OD_BLEPHARITIS: RLL RUL 2+ 3+
OS_BLEPHARITIS: LLL LUL 2+ 3+

## 2023-05-18 ASSESSMENT — VISUAL ACUITY
OS_BCVA: 20/30
OD_BCVA: 20/30-1

## 2023-05-18 ASSESSMENT — AXIALLENGTH_DERIVED
OD_AL: 23.2498
OS_AL: 22.7863

## 2023-05-18 ASSESSMENT — TONOMETRY: OS_IOP_MMHG: 19

## 2023-05-18 ASSESSMENT — SUPERFICIAL PUNCTATE KERATITIS (SPK)
OS_SPK: 3+
OD_SPK: 2+

## 2023-05-18 ASSESSMENT — CONFRONTATIONAL VISUAL FIELD TEST (CVF)
OD_FINDINGS: FULL
OS_FINDINGS: FULL

## 2023-07-06 ENCOUNTER — OFFICE (OUTPATIENT)
Dept: URBAN - METROPOLITAN AREA CLINIC 94 | Facility: CLINIC | Age: 73
Setting detail: OPHTHALMOLOGY
End: 2023-07-06
Payer: MEDICARE

## 2023-07-06 DIAGNOSIS — H35.3112: ICD-10-CM

## 2023-07-06 DIAGNOSIS — H43.813: ICD-10-CM

## 2023-07-06 DIAGNOSIS — E11.3313: ICD-10-CM

## 2023-07-06 DIAGNOSIS — H35.3221: ICD-10-CM

## 2023-07-06 DIAGNOSIS — H35.373: ICD-10-CM

## 2023-07-06 PROCEDURE — 67028 INJECTION EYE DRUG: CPT | Performed by: SPECIALIST

## 2023-07-06 PROCEDURE — 92134 CPTRZ OPH DX IMG PST SGM RTA: CPT | Performed by: SPECIALIST

## 2023-07-06 PROCEDURE — 92235 FLUORESCEIN ANGRPH MLTIFRAME: CPT | Performed by: SPECIALIST

## 2023-07-06 PROCEDURE — 92012 INTRM OPH EXAM EST PATIENT: CPT | Performed by: SPECIALIST

## 2023-07-06 ASSESSMENT — SPHEQUIV_DERIVED
OS_SPHEQUIV: 1.25
OD_SPHEQUIV: 0.25

## 2023-07-06 ASSESSMENT — REFRACTION_AUTOREFRACTION
OD_AXIS: 120
OS_CYLINDER: -0.50
OS_AXIS: 095
OD_SPHERE: +0.75
OD_CYLINDER: -1.00
OS_SPHERE: +1.50

## 2023-07-06 ASSESSMENT — LID EXAM ASSESSMENTS
OS_BLEPHARITIS: LLL LUL 2+ 3+
OD_BLEPHARITIS: RLL RUL 2+ 3+

## 2023-07-06 ASSESSMENT — TONOMETRY
OD_IOP_MMHG: 19
OS_IOP_MMHG: 20

## 2023-07-06 ASSESSMENT — KERATOMETRY
OD_AXISANGLE_DEGREES: 78
OD_K2POWER_DIOPTERS: 44.41
OS_K1POWER_DIOPTERS: 43.83
OD_K1POWER_DIOPTERS: 43.95
OS_AXISANGLE_DEGREES: 145
OS_K2POWER_DIOPTERS: 45.06

## 2023-07-06 ASSESSMENT — SUPERFICIAL PUNCTATE KERATITIS (SPK)
OS_SPK: 3+
OD_SPK: 2+

## 2023-07-06 ASSESSMENT — VISUAL ACUITY
OS_BCVA: 20/25
OD_BCVA: 20/50

## 2023-07-06 ASSESSMENT — CONFRONTATIONAL VISUAL FIELD TEST (CVF)
OS_FINDINGS: FULL
OD_FINDINGS: FULL

## 2023-07-06 ASSESSMENT — AXIALLENGTH_DERIVED
OD_AL: 23.2498
OS_AL: 22.7863

## 2023-08-03 ENCOUNTER — ASC (OUTPATIENT)
Dept: URBAN - METROPOLITAN AREA SURGERY 8 | Facility: SURGERY | Age: 73
Setting detail: OPHTHALMOLOGY
End: 2023-08-03
Payer: MEDICARE

## 2023-08-03 ENCOUNTER — OFFICE (OUTPATIENT)
Dept: URBAN - METROPOLITAN AREA CLINIC 94 | Facility: CLINIC | Age: 73
Setting detail: OPHTHALMOLOGY
End: 2023-08-03
Payer: MEDICARE

## 2023-08-03 DIAGNOSIS — E11.3312: ICD-10-CM

## 2023-08-03 DIAGNOSIS — E11.3313: ICD-10-CM

## 2023-08-03 DIAGNOSIS — H35.3221: ICD-10-CM

## 2023-08-03 DIAGNOSIS — H35.373: ICD-10-CM

## 2023-08-03 DIAGNOSIS — H35.3112: ICD-10-CM

## 2023-08-03 PROCEDURE — 67210 TREATMENT OF RETINAL LESION: CPT | Performed by: SPECIALIST

## 2023-08-03 PROCEDURE — 92134 CPTRZ OPH DX IMG PST SGM RTA: CPT | Performed by: SPECIALIST

## 2023-08-03 ASSESSMENT — VISUAL ACUITY
OS_BCVA: 20/30
OD_BCVA: 20/60

## 2023-08-03 ASSESSMENT — KERATOMETRY
OD_AXISANGLE_DEGREES: 075
OD_K2POWER_DIOPTERS: 44.45
OS_K1POWER_DIOPTERS: 43.85
OS_K2POWER_DIOPTERS: 45.05
OD_K1POWER_DIOPTERS: 43.95
OS_AXISANGLE_DEGREES: 145

## 2023-08-03 ASSESSMENT — REFRACTION_AUTOREFRACTION
OD_SPHERE: +0.75
OS_SPHERE: +1.50
OD_AXIS: 120
OS_AXIS: 095
OD_CYLINDER: -1.00
OS_CYLINDER: -0.50

## 2023-08-03 ASSESSMENT — TONOMETRY
OS_IOP_MMHG: 20
OD_IOP_MMHG: 20

## 2023-08-03 ASSESSMENT — AXIALLENGTH_DERIVED
OS_AL: 22.78
OD_AL: 23.2426

## 2023-08-03 ASSESSMENT — LID EXAM ASSESSMENTS
OS_BLEPHARITIS: LLL LUL 2+ 3+
OD_BLEPHARITIS: RLL RUL 2+ 3+

## 2023-08-03 ASSESSMENT — SUPERFICIAL PUNCTATE KERATITIS (SPK)
OS_SPK: 3+
OD_SPK: 2+

## 2023-08-03 ASSESSMENT — CONFRONTATIONAL VISUAL FIELD TEST (CVF)
OS_FINDINGS: FULL
OD_FINDINGS: FULL

## 2023-08-03 ASSESSMENT — SPHEQUIV_DERIVED
OS_SPHEQUIV: 1.25
OD_SPHEQUIV: 0.25

## 2023-08-24 ENCOUNTER — OFFICE (OUTPATIENT)
Dept: URBAN - METROPOLITAN AREA CLINIC 94 | Facility: CLINIC | Age: 73
Setting detail: OPHTHALMOLOGY
End: 2023-08-24
Payer: MEDICARE

## 2023-08-24 ENCOUNTER — ASC (OUTPATIENT)
Dept: URBAN - METROPOLITAN AREA SURGERY 8 | Facility: SURGERY | Age: 73
Setting detail: OPHTHALMOLOGY
End: 2023-08-24
Payer: MEDICARE

## 2023-08-24 DIAGNOSIS — H43.813: ICD-10-CM

## 2023-08-24 DIAGNOSIS — H35.373: ICD-10-CM

## 2023-08-24 DIAGNOSIS — H35.3112: ICD-10-CM

## 2023-08-24 DIAGNOSIS — E11.3311: ICD-10-CM

## 2023-08-24 DIAGNOSIS — H35.3221: ICD-10-CM

## 2023-08-24 DIAGNOSIS — E11.3313: ICD-10-CM

## 2023-08-24 PROCEDURE — 67210 TREATMENT OF RETINAL LESION: CPT | Performed by: SPECIALIST

## 2023-08-24 PROCEDURE — 92134 CPTRZ OPH DX IMG PST SGM RTA: CPT | Performed by: SPECIALIST

## 2023-08-24 ASSESSMENT — TONOMETRY
OD_IOP_MMHG: 17
OS_IOP_MMHG: 16

## 2023-08-24 ASSESSMENT — KERATOMETRY
OS_K2POWER_DIOPTERS: 45.05
OS_AXISANGLE_DEGREES: 145
OD_AXISANGLE_DEGREES: 075
OS_K1POWER_DIOPTERS: 43.85
OD_K1POWER_DIOPTERS: 43.95
OD_K2POWER_DIOPTERS: 44.45

## 2023-08-24 ASSESSMENT — AXIALLENGTH_DERIVED
OD_AL: 23.2426
OS_AL: 22.78

## 2023-08-24 ASSESSMENT — REFRACTION_AUTOREFRACTION
OS_AXIS: 095
OD_AXIS: 120
OS_CYLINDER: -0.50
OD_CYLINDER: -1.00
OD_SPHERE: +0.75
OS_SPHERE: +1.50

## 2023-08-24 ASSESSMENT — LID EXAM ASSESSMENTS
OS_BLEPHARITIS: LLL LUL 2+ 3+
OD_BLEPHARITIS: RLL RUL 2+ 3+

## 2023-08-24 ASSESSMENT — CONFRONTATIONAL VISUAL FIELD TEST (CVF)
OD_FINDINGS: FULL
OS_FINDINGS: FULL

## 2023-08-24 ASSESSMENT — SUPERFICIAL PUNCTATE KERATITIS (SPK)
OS_SPK: 3+
OD_SPK: 2+

## 2023-08-24 ASSESSMENT — SPHEQUIV_DERIVED
OS_SPHEQUIV: 1.25
OD_SPHEQUIV: 0.25

## 2023-08-24 ASSESSMENT — VISUAL ACUITY
OS_BCVA: 20/25+
OD_BCVA: 20/40+

## 2023-08-28 NOTE — ED BEHAVIORAL HEALTH ASSESSMENT NOTE - MUSCLE TONE / STRENGTH
"  Caller: Amos Powell \"[place]\"    Relationship: Self    Best call back number:     [phone]       What medication are you requesting: AMOXICILLIN AND CIPROFLOXACIA        If a prescription is needed, what is your preferred pharmacy and phone number:      [id number] - [facility] [place] [address] [phone] [facility] [phone]        Additional notes:    PATIENT NEEDS NEW SCRIPTS FOR THESE MEDICATIONS.        " Normal muscle tone/strength

## 2023-09-21 ENCOUNTER — OFFICE (OUTPATIENT)
Dept: URBAN - METROPOLITAN AREA CLINIC 94 | Facility: CLINIC | Age: 73
Setting detail: OPHTHALMOLOGY
End: 2023-09-21
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
## 2023-09-21 PROCEDURE — NO SHOW FE NO SHOW FEE: Performed by: SPECIALIST

## 2023-11-20 ENCOUNTER — OFFICE (OUTPATIENT)
Dept: URBAN - METROPOLITAN AREA CLINIC 94 | Facility: CLINIC | Age: 73
Setting detail: OPHTHALMOLOGY
End: 2023-11-20
Payer: MEDICARE

## 2023-11-20 DIAGNOSIS — E11.3313: ICD-10-CM

## 2023-11-20 DIAGNOSIS — H35.3221: ICD-10-CM

## 2023-11-20 DIAGNOSIS — H35.373: ICD-10-CM

## 2023-11-20 DIAGNOSIS — H35.3112: ICD-10-CM

## 2023-11-20 DIAGNOSIS — H43.813: ICD-10-CM

## 2023-11-20 PROCEDURE — 92235 FLUORESCEIN ANGRPH MLTIFRAME: CPT | Performed by: SPECIALIST

## 2023-11-20 PROCEDURE — 92134 CPTRZ OPH DX IMG PST SGM RTA: CPT | Performed by: SPECIALIST

## 2023-11-20 PROCEDURE — 92012 INTRM OPH EXAM EST PATIENT: CPT | Mod: 24,25 | Performed by: SPECIALIST

## 2023-11-20 PROCEDURE — 67028 INJECTION EYE DRUG: CPT | Mod: 79,LT | Performed by: SPECIALIST

## 2023-11-20 ASSESSMENT — LID EXAM ASSESSMENTS
OD_BLEPHARITIS: RLL RUL 2+ 3+
OS_BLEPHARITIS: LLL LUL 2+ 3+

## 2023-11-20 ASSESSMENT — REFRACTION_AUTOREFRACTION
OS_CYLINDER: -0.50
OD_AXIS: 120
OS_SPHERE: +1.50
OS_AXIS: 095
OD_SPHERE: +0.75
OD_CYLINDER: -1.00

## 2023-11-20 ASSESSMENT — SPHEQUIV_DERIVED
OS_SPHEQUIV: 1.25
OD_SPHEQUIV: 0.25

## 2023-11-20 ASSESSMENT — SUPERFICIAL PUNCTATE KERATITIS (SPK)
OS_SPK: 3+
OD_SPK: 2+

## 2023-12-04 ENCOUNTER — OFFICE (OUTPATIENT)
Dept: URBAN - METROPOLITAN AREA CLINIC 94 | Facility: CLINIC | Age: 73
Setting detail: OPHTHALMOLOGY
End: 2023-12-04
Payer: MEDICARE

## 2023-12-04 ENCOUNTER — ASC (OUTPATIENT)
Dept: URBAN - METROPOLITAN AREA SURGERY 8 | Facility: SURGERY | Age: 73
Setting detail: OPHTHALMOLOGY
End: 2023-12-04
Payer: MEDICARE

## 2023-12-04 DIAGNOSIS — H35.3112: ICD-10-CM

## 2023-12-04 DIAGNOSIS — H35.3221: ICD-10-CM

## 2023-12-04 DIAGNOSIS — E11.3311: ICD-10-CM

## 2023-12-04 DIAGNOSIS — E11.3313: ICD-10-CM

## 2023-12-04 PROCEDURE — 67210 TREATMENT OF RETINAL LESION: CPT | Mod: RT | Performed by: SPECIALIST

## 2023-12-04 PROCEDURE — 92134 CPTRZ OPH DX IMG PST SGM RTA: CPT | Performed by: SPECIALIST

## 2023-12-04 ASSESSMENT — REFRACTION_AUTOREFRACTION
OS_SPHERE: +1.50
OD_CYLINDER: -1.00
OS_CYLINDER: -0.50
OD_SPHERE: +0.75
OD_AXIS: 120
OS_AXIS: 095

## 2023-12-04 ASSESSMENT — LID EXAM ASSESSMENTS
OS_BLEPHARITIS: LLL LUL 2+ 3+
OD_BLEPHARITIS: RLL RUL 2+ 3+

## 2023-12-04 ASSESSMENT — SUPERFICIAL PUNCTATE KERATITIS (SPK)
OS_SPK: 3+
OD_SPK: 2+

## 2023-12-04 ASSESSMENT — SPHEQUIV_DERIVED
OD_SPHEQUIV: 0.25
OS_SPHEQUIV: 1.25

## 2023-12-11 ENCOUNTER — ASC (OUTPATIENT)
Dept: URBAN - METROPOLITAN AREA SURGERY 8 | Facility: SURGERY | Age: 73
Setting detail: OPHTHALMOLOGY
End: 2023-12-11
Payer: MEDICARE

## 2023-12-11 DIAGNOSIS — E11.3312: ICD-10-CM

## 2023-12-11 PROCEDURE — 67210 TREATMENT OF RETINAL LESION: CPT | Mod: 79,LT | Performed by: SPECIALIST

## 2023-12-11 ASSESSMENT — SPHEQUIV_DERIVED
OD_SPHEQUIV: 0.25
OS_SPHEQUIV: 1.25

## 2023-12-11 ASSESSMENT — REFRACTION_AUTOREFRACTION
OS_CYLINDER: -0.50
OD_SPHERE: +0.75
OS_SPHERE: +1.50
OS_AXIS: 095
OD_AXIS: 120
OD_CYLINDER: -1.00

## 2023-12-11 ASSESSMENT — SUPERFICIAL PUNCTATE KERATITIS (SPK)
OD_SPK: 2+
OS_SPK: 3+

## 2023-12-11 ASSESSMENT — LID EXAM ASSESSMENTS
OD_BLEPHARITIS: RLL RUL 2+ 3+
OS_BLEPHARITIS: LLL LUL 2+ 3+

## 2023-12-11 ASSESSMENT — CONFRONTATIONAL VISUAL FIELD TEST (CVF)
OD_FINDINGS: FULL
OS_FINDINGS: FULL

## 2024-01-15 ENCOUNTER — OFFICE (OUTPATIENT)
Dept: URBAN - METROPOLITAN AREA CLINIC 94 | Facility: CLINIC | Age: 74
Setting detail: OPHTHALMOLOGY
End: 2024-01-15
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
## 2024-01-15 PROCEDURE — NO SHOW FE NO SHOW FEE: Performed by: SPECIALIST

## 2024-01-29 ENCOUNTER — OFFICE (OUTPATIENT)
Dept: URBAN - METROPOLITAN AREA CLINIC 94 | Facility: CLINIC | Age: 74
Setting detail: OPHTHALMOLOGY
End: 2024-01-29
Payer: MEDICARE

## 2024-01-29 DIAGNOSIS — H43.813: ICD-10-CM

## 2024-01-29 DIAGNOSIS — E11.3313: ICD-10-CM

## 2024-01-29 DIAGNOSIS — H35.3221: ICD-10-CM

## 2024-01-29 DIAGNOSIS — H35.373: ICD-10-CM

## 2024-01-29 DIAGNOSIS — H35.3112: ICD-10-CM

## 2024-01-29 PROCEDURE — 92134 CPTRZ OPH DX IMG PST SGM RTA: CPT | Performed by: SPECIALIST

## 2024-01-29 PROCEDURE — 92012 INTRM OPH EXAM EST PATIENT: CPT | Mod: 24 | Performed by: SPECIALIST

## 2024-01-29 ASSESSMENT — LID EXAM ASSESSMENTS
OS_BLEPHARITIS: LLL LUL 2+ 3+
OD_BLEPHARITIS: RLL RUL 2+ 3+

## 2024-01-29 ASSESSMENT — REFRACTION_AUTOREFRACTION
OD_AXIS: 120
OS_CYLINDER: -0.50
OD_CYLINDER: -1.00
OS_SPHERE: +1.50
OD_SPHERE: +0.75
OS_AXIS: 095

## 2024-01-29 ASSESSMENT — CONFRONTATIONAL VISUAL FIELD TEST (CVF)
OD_FINDINGS: FULL
OS_FINDINGS: FULL

## 2024-01-29 ASSESSMENT — SUPERFICIAL PUNCTATE KERATITIS (SPK)
OD_SPK: 2+
OS_SPK: 3+

## 2024-01-29 ASSESSMENT — SPHEQUIV_DERIVED
OS_SPHEQUIV: 1.25
OD_SPHEQUIV: 0.25

## 2024-02-26 ENCOUNTER — OFFICE (OUTPATIENT)
Dept: URBAN - METROPOLITAN AREA CLINIC 94 | Facility: CLINIC | Age: 74
Setting detail: OPHTHALMOLOGY
End: 2024-02-26
Payer: MEDICARE

## 2024-02-26 DIAGNOSIS — H35.373: ICD-10-CM

## 2024-02-26 DIAGNOSIS — E11.3312: ICD-10-CM

## 2024-02-26 PROCEDURE — 67028 INJECTION EYE DRUG: CPT | Mod: 58,LT | Performed by: SPECIALIST

## 2024-02-26 PROCEDURE — 92134 CPTRZ OPH DX IMG PST SGM RTA: CPT | Performed by: SPECIALIST

## 2024-02-26 ASSESSMENT — SPHEQUIV_DERIVED
OS_SPHEQUIV: 1.25
OD_SPHEQUIV: 0.25

## 2024-02-26 ASSESSMENT — LID EXAM ASSESSMENTS
OS_BLEPHARITIS: LLL LUL 2+ 3+
OD_BLEPHARITIS: RLL RUL 2+ 3+

## 2024-02-26 ASSESSMENT — REFRACTION_AUTOREFRACTION
OS_AXIS: 095
OS_SPHERE: +1.50
OD_CYLINDER: -1.00
OD_SPHERE: +0.75
OD_AXIS: 120
OS_CYLINDER: -0.50

## 2024-02-26 ASSESSMENT — CONFRONTATIONAL VISUAL FIELD TEST (CVF)
OS_FINDINGS: FULL
OD_FINDINGS: FULL

## 2024-02-26 ASSESSMENT — SUPERFICIAL PUNCTATE KERATITIS (SPK)
OS_SPK: 3+
OD_SPK: 2+

## 2024-03-11 ENCOUNTER — ASC (OUTPATIENT)
Dept: URBAN - METROPOLITAN AREA SURGERY 8 | Facility: SURGERY | Age: 74
Setting detail: OPHTHALMOLOGY
End: 2024-03-11
Payer: MEDICARE

## 2024-03-11 ENCOUNTER — OFFICE (OUTPATIENT)
Dept: URBAN - METROPOLITAN AREA CLINIC 94 | Facility: CLINIC | Age: 74
Setting detail: OPHTHALMOLOGY
End: 2024-03-11
Payer: MEDICARE

## 2024-03-11 DIAGNOSIS — E11.3312: ICD-10-CM

## 2024-03-11 DIAGNOSIS — H43.813: ICD-10-CM

## 2024-03-11 DIAGNOSIS — H35.3221: ICD-10-CM

## 2024-03-11 DIAGNOSIS — E11.3313: ICD-10-CM

## 2024-03-11 PROCEDURE — 67210 TREATMENT OF RETINAL LESION: CPT | Mod: LT | Performed by: SPECIALIST

## 2024-03-11 PROCEDURE — 92235 FLUORESCEIN ANGRPH MLTIFRAME: CPT | Performed by: SPECIALIST

## 2024-03-11 PROCEDURE — 92012 INTRM OPH EXAM EST PATIENT: CPT | Mod: 57 | Performed by: SPECIALIST

## 2024-03-11 PROCEDURE — 92134 CPTRZ OPH DX IMG PST SGM RTA: CPT | Performed by: SPECIALIST

## 2024-03-11 ASSESSMENT — LID EXAM ASSESSMENTS
OS_BLEPHARITIS: LLL LUL 2+ 3+
OD_BLEPHARITIS: RLL RUL 2+ 3+

## 2024-04-04 ENCOUNTER — ASC (OUTPATIENT)
Dept: URBAN - METROPOLITAN AREA SURGERY 8 | Facility: SURGERY | Age: 74
Setting detail: OPHTHALMOLOGY
End: 2024-04-04
Payer: MEDICARE

## 2024-04-04 DIAGNOSIS — E11.3311: ICD-10-CM

## 2024-04-04 PROCEDURE — 67210 TREATMENT OF RETINAL LESION: CPT | Mod: 79,RT | Performed by: SPECIALIST

## 2024-04-04 ASSESSMENT — LID EXAM ASSESSMENTS
OD_BLEPHARITIS: RLL RUL 2+ 3+
OS_BLEPHARITIS: LLL LUL 2+ 3+

## 2024-04-08 ENCOUNTER — OFFICE (OUTPATIENT)
Dept: URBAN - METROPOLITAN AREA CLINIC 94 | Facility: CLINIC | Age: 74
Setting detail: OPHTHALMOLOGY
End: 2024-04-08
Payer: MEDICARE

## 2024-04-08 DIAGNOSIS — E11.3313: ICD-10-CM

## 2024-04-08 PROCEDURE — 99024 POSTOP FOLLOW-UP VISIT: CPT | Performed by: SPECIALIST

## 2024-04-08 PROCEDURE — 92134 CPTRZ OPH DX IMG PST SGM RTA: CPT | Performed by: SPECIALIST

## 2024-04-08 ASSESSMENT — LID EXAM ASSESSMENTS
OD_BLEPHARITIS: RLL RUL 2+ 3+
OS_BLEPHARITIS: LLL LUL 2+ 3+

## 2024-05-20 ENCOUNTER — OFFICE (OUTPATIENT)
Dept: URBAN - METROPOLITAN AREA CLINIC 94 | Facility: CLINIC | Age: 74
Setting detail: OPHTHALMOLOGY
End: 2024-05-20
Payer: MEDICARE

## 2024-05-20 DIAGNOSIS — H35.3221: ICD-10-CM

## 2024-05-20 DIAGNOSIS — E11.3313: ICD-10-CM

## 2024-05-20 DIAGNOSIS — E11.3312: ICD-10-CM

## 2024-05-20 PROCEDURE — 92134 CPTRZ OPH DX IMG PST SGM RTA: CPT | Performed by: SPECIALIST

## 2024-05-20 PROCEDURE — 99024 POSTOP FOLLOW-UP VISIT: CPT | Performed by: SPECIALIST

## 2024-05-20 PROCEDURE — 67028 INJECTION EYE DRUG: CPT | Mod: 58,LT | Performed by: SPECIALIST

## 2024-05-20 ASSESSMENT — LID EXAM ASSESSMENTS
OD_BLEPHARITIS: RLL RUL 2+ 3+
OS_BLEPHARITIS: LLL LUL 2+ 3+

## 2024-07-29 ENCOUNTER — OFFICE (OUTPATIENT)
Dept: URBAN - METROPOLITAN AREA CLINIC 94 | Facility: CLINIC | Age: 74
Setting detail: OPHTHALMOLOGY
End: 2024-07-29
Payer: MEDICARE

## 2024-07-29 DIAGNOSIS — H35.373: ICD-10-CM

## 2024-07-29 DIAGNOSIS — H35.3221: ICD-10-CM

## 2024-07-29 DIAGNOSIS — E11.3313: ICD-10-CM

## 2024-07-29 DIAGNOSIS — E11.3312: ICD-10-CM

## 2024-07-29 DIAGNOSIS — E11.3311: ICD-10-CM

## 2024-07-29 PROCEDURE — 92134 CPTRZ OPH DX IMG PST SGM RTA: CPT | Performed by: SPECIALIST

## 2024-07-29 PROCEDURE — 67028 INJECTION EYE DRUG: CPT | Mod: LT | Performed by: SPECIALIST

## 2024-07-29 PROCEDURE — 92235 FLUORESCEIN ANGRPH MLTIFRAME: CPT | Performed by: SPECIALIST

## 2024-07-29 ASSESSMENT — LID EXAM ASSESSMENTS
OD_BLEPHARITIS: RLL RUL 2+ 3+
OS_BLEPHARITIS: LLL LUL 2+ 3+

## 2024-08-19 ENCOUNTER — ASC (OUTPATIENT)
Dept: URBAN - METROPOLITAN AREA SURGERY 8 | Facility: SURGERY | Age: 74
Setting detail: OPHTHALMOLOGY
End: 2024-08-19
Payer: MEDICARE

## 2024-08-19 ENCOUNTER — OFFICE (OUTPATIENT)
Dept: URBAN - METROPOLITAN AREA CLINIC 94 | Facility: CLINIC | Age: 74
Setting detail: OPHTHALMOLOGY
End: 2024-08-19
Payer: MEDICARE

## 2024-08-19 DIAGNOSIS — H35.3112: ICD-10-CM

## 2024-08-19 DIAGNOSIS — H35.3221: ICD-10-CM

## 2024-08-19 DIAGNOSIS — E11.3313: ICD-10-CM

## 2024-08-19 DIAGNOSIS — E11.3312: ICD-10-CM

## 2024-08-19 DIAGNOSIS — H35.373: ICD-10-CM

## 2024-08-19 PROCEDURE — 92134 CPTRZ OPH DX IMG PST SGM RTA: CPT | Performed by: SPECIALIST

## 2024-08-19 PROCEDURE — 67210 TREATMENT OF RETINAL LESION: CPT | Mod: LT | Performed by: SPECIALIST

## 2024-08-19 PROCEDURE — 92012 INTRM OPH EXAM EST PATIENT: CPT | Mod: 57 | Performed by: SPECIALIST

## 2024-08-19 ASSESSMENT — LID EXAM ASSESSMENTS
OD_BLEPHARITIS: RLL RUL 2+ 3+
OS_BLEPHARITIS: LLL LUL 2+ 3+

## 2024-08-30 ENCOUNTER — NON-APPOINTMENT (OUTPATIENT)
Age: 74
End: 2024-08-30

## 2024-09-23 ENCOUNTER — ASC (OUTPATIENT)
Dept: URBAN - METROPOLITAN AREA SURGERY 8 | Facility: SURGERY | Age: 74
Setting detail: OPHTHALMOLOGY
End: 2024-09-23
Payer: MEDICARE

## 2024-09-23 DIAGNOSIS — H43.813: ICD-10-CM

## 2024-09-23 DIAGNOSIS — E11.3311: ICD-10-CM

## 2024-09-23 DIAGNOSIS — E11.3313: ICD-10-CM

## 2024-09-23 DIAGNOSIS — H35.373: ICD-10-CM

## 2024-09-23 DIAGNOSIS — H35.3221: ICD-10-CM

## 2024-09-23 DIAGNOSIS — H35.3112: ICD-10-CM

## 2024-09-23 PROCEDURE — 67210 TREATMENT OF RETINAL LESION: CPT | Mod: 79,RT | Performed by: SPECIALIST

## 2024-09-23 PROCEDURE — 99024 POSTOP FOLLOW-UP VISIT: CPT | Performed by: SPECIALIST

## 2024-09-23 ASSESSMENT — LID EXAM ASSESSMENTS
OD_BLEPHARITIS: RLL RUL 2+ 3+
OS_BLEPHARITIS: LLL LUL 2+ 3+

## 2024-09-23 ASSESSMENT — CONFRONTATIONAL VISUAL FIELD TEST (CVF)
OS_FINDINGS: FULL
OD_FINDINGS: FULL

## 2024-10-07 ENCOUNTER — OFFICE (OUTPATIENT)
Dept: URBAN - METROPOLITAN AREA CLINIC 94 | Facility: CLINIC | Age: 74
Setting detail: OPHTHALMOLOGY
End: 2024-10-07
Payer: MEDICARE

## 2024-10-07 DIAGNOSIS — H35.373: ICD-10-CM

## 2024-10-07 DIAGNOSIS — E11.3312: ICD-10-CM

## 2024-10-07 PROCEDURE — 67028 INJECTION EYE DRUG: CPT | Mod: 58,LT | Performed by: SPECIALIST

## 2024-10-07 PROCEDURE — 92134 CPTRZ OPH DX IMG PST SGM RTA: CPT | Performed by: SPECIALIST

## 2024-10-07 ASSESSMENT — REFRACTION_AUTOREFRACTION
OS_AXIS: 095
OS_SPHERE: +1.50
OD_AXIS: 120
OD_SPHERE: +0.75
OD_CYLINDER: -1.00
OS_CYLINDER: -0.50

## 2024-10-07 ASSESSMENT — CONFRONTATIONAL VISUAL FIELD TEST (CVF)
OD_FINDINGS: FULL
OS_FINDINGS: FULL

## 2024-10-07 ASSESSMENT — SUPERFICIAL PUNCTATE KERATITIS (SPK)
OD_SPK: 2+
OS_SPK: 3+

## 2024-10-07 ASSESSMENT — TONOMETRY
OD_IOP_MMHG: 18
OS_IOP_MMHG: 19

## 2024-10-07 ASSESSMENT — KERATOMETRY
OD_K2POWER_DIOPTERS: 44.45
METHOD_AUTO_MANUAL: AUTO
OS_AXISANGLE_DEGREES: 145
OD_K1POWER_DIOPTERS: 43.95
OS_K1POWER_DIOPTERS: 43.85
OD_AXISANGLE_DEGREES: 075
OS_K2POWER_DIOPTERS: 45.05

## 2024-10-07 ASSESSMENT — VISUAL ACUITY
OD_BCVA: 20/50-1
OS_BCVA: 20/30-1

## 2024-10-07 ASSESSMENT — LID EXAM ASSESSMENTS
OD_BLEPHARITIS: RLL RUL 2+ 3+
OS_BLEPHARITIS: LLL LUL 2+ 3+

## 2024-10-11 NOTE — ED BEHAVIORAL HEALTH ASSESSMENT NOTE - FUND OF KNOWLEDGE
"   10/11/24 1100   Pain Assessment   Pain Assessment Tool 0-10   Pain Score No Pain   Restrictions/Precautions   Precautions Bed/chair alarms;Fall Risk;Supervision on toilet/commode;Visual deficit   Weight Bearing Restrictions No   ROM Restrictions No   Cognition   Overall Cognitive Status WFL   Arousal/Participation Alert;Responsive;Cooperative   Attention Attends with cues to redirect   Orientation Level Oriented X4   Memory Within functional limits   Following Commands Follows one step commands without difficulty   Subjective   Subjective \"can we do the steps again\"   Roll Left and Right   Comment Pt OOB   Sit to Lying   Comment Pt OOB   Lying to Sitting on Side of Bed   Comment Pt OOB   Sit to Stand   Type of Assistance Needed Incidental touching   Physical Assistance Level No physical assistance   Comment with RW   Sit to Stand CARE Score 4   Bed-Chair Transfer   Type of Assistance Needed Incidental touching   Physical Assistance Level No physical assistance   Comment with RW   Chair/Bed-to-Chair Transfer CARE Score 4   Car Transfer   Reason if not Attempted Environmental limitations   Car Transfer CARE Score 10   Walk 10 Feet   Type of Assistance Needed Physical assistance   Physical Assistance Level 25% or less   Comment CG-Min A x 1 with SPC on R on level surfaces.   Walk 10 Feet CARE Score 3   Walk 50 Feet with Two Turns   Type of Assistance Needed Physical assistance   Physical Assistance Level 25% or less   Comment CG-Min A x 1 with SPC on R on level surfaces.   Walk 50 Feet with Two Turns CARE Score 3   Walk 150 Feet   Reason if not Attempted Safety concerns   Walk 150 Feet CARE Score 88   Walking 10 Feet on Uneven Surfaces   Reason if not Attempted Safety concerns   Walking 10 Feet on Uneven Surfaces CARE Score 88   Ambulation   Primary Mode of Locomotion Prior to Admission Walk   Distance Walked (feet) 125 ft  (125 ft and short distances in PT gym)   Assist Device Cane   Gait Pattern " Ataxic;Inconsistant Marsha;Decreased foot clearance;R foot drag;Shuffle   Limitations Noted In Balance;Coordination;Device Management;Heel Strike;Endurance;Posture;Safety;Strength   Provided Assistance with: Balance;Trunk Support   Walk Assist Level Minimum Assist   Findings CG-Min A x 1 with SPC on R on level surfaces.   Does the patient walk? 2. Yes   Wheelchair mobility   Does the patient use a wheelchair? 1. Yes   Curb or Single Stair   Comment tested in earlier session   Picking Up Object   Type of Assistance Needed Physical assistance   Physical Assistance Level 26%-50%   Picking Up Object CARE Score 3   Toilet Transfer   Comment not needed during session   Therapeutic Interventions   Balance Gait and transfer training   Neuromuscular Re-Education Randhawa balance test   Assessment   Treatment Assessment Pt agreeable to PT session this morning. No reports of pain. CG with RW for transfers. Trialed ambulation with R SPC again, started as CG and progressed to Min A x 1 as patient fatigued. Ambulated 125 ft on level surfaces. Gait and transfer training for increased balance, safety, and independence with functional mobility. Randhawa balance test performed with score of 17/56 indicating a high risk of falls. Pt aware his balance needs improvement and was not surprised with outcome of the test. Pt wheeled by OT out of PT gym to OT session.   Problem List Decreased strength;Decreased coordination;Decreased mobility;Impaired balance;Decreased endurance;Impaired judgement;Decreased safety awareness;Impaired vision   Barriers to Discharge Inaccessible home environment;Decreased caregiver support   PT Barriers   Physical Impairment Decreased strength;Decreased range of motion;Decreased endurance;Impaired balance;Decreased mobility;Decreased coordination;Decreased safety awareness;Impaired vision   Functional Limitation Wheelchair management;Walking;Transfers;Standing;Stair negotiation;Ramp negotiation;Car transfers   Plan    Treatment/Interventions Functional transfer training;LE strengthening/ROM;Elevations;Therapeutic exercise;Endurance training;Equipment eval/education;Bed mobility;Gait training   Progress Progressing toward goals   PT Therapy Minutes   PT Time In 1100   PT Time Out 1130   PT Total Time (minutes) 30   PT Mode of treatment - Individual (minutes) 30   PT Mode of treatment - Concurrent (minutes) 0   PT Mode of treatment - Group (minutes) 0   PT Mode of treatment - Co-treat (minutes) 0   PT Mode of Treatment - Total time(minutes) 30 minutes   PT Cumulative Minutes 753   Therapy Time missed   Time missed? No       Randhawa Balance Test:     Randhawa Balance Scale  1. Sitting to standing  1) needs min aide to stand or stabilize    2.  Standing Unsupported  3) able to stand 2 minutes with supervision    3.  Sitting with back unsupported but feet supported on floor or on a stool   4)  able to sit safely and securely for 2 minutes      4.  Standing to sitting  3) controls descent by using hands    5.  Transfers  1) needs one person to assist    6.  Standing unsupported with eyes closed  3) able to stand 10 seconds with supervision    7.  Standing unsupported with feet together  1) needs help to attain position, but able to stand 15 seconds feet together    8.  Reaching forward with outstretched arm while standing  0) loses balance while trying/requires external support    9.   object from the floor from a standing position  0) unable to try/needs assist to keep from losing balance or falling    10.  Turning to look behind over left and right shoulders while standing  1) needs supervision when turning    11.  Turn 360 degrees  0) needs assistance while turning    12.  Place alternate foot on step or stool while standing unsupported  0) needs assistance to keep from falling/unable to try    13.  Standing unsupported one foot in front  0) loses balance while stepping or standing    14.  Standing on one leg  0)  unablto try,  needs assist to prevent fall    TOTAL SCORE: 17 of maximum of 56 indicating high fall risk    Normal

## 2024-10-28 ENCOUNTER — OFFICE (OUTPATIENT)
Dept: URBAN - METROPOLITAN AREA CLINIC 94 | Facility: CLINIC | Age: 74
Setting detail: OPHTHALMOLOGY
End: 2024-10-28
Payer: MEDICARE

## 2024-10-28 DIAGNOSIS — H43.813: ICD-10-CM

## 2024-10-28 DIAGNOSIS — H35.3112: ICD-10-CM

## 2024-10-28 DIAGNOSIS — H35.373: ICD-10-CM

## 2024-10-28 DIAGNOSIS — E11.3313: ICD-10-CM

## 2024-10-28 DIAGNOSIS — H35.3221: ICD-10-CM

## 2024-10-28 PROCEDURE — 99024 POSTOP FOLLOW-UP VISIT: CPT | Performed by: SPECIALIST

## 2024-10-28 PROCEDURE — 92134 CPTRZ OPH DX IMG PST SGM RTA: CPT | Performed by: SPECIALIST

## 2024-10-28 ASSESSMENT — KERATOMETRY
OS_AXISANGLE_DEGREES: 145
METHOD_AUTO_MANUAL: AUTO
OD_K2POWER_DIOPTERS: 44.45
OD_K1POWER_DIOPTERS: 43.95
OS_K2POWER_DIOPTERS: 45.05
OD_AXISANGLE_DEGREES: 075
OS_K1POWER_DIOPTERS: 43.85

## 2024-10-28 ASSESSMENT — LID EXAM ASSESSMENTS
OS_BLEPHARITIS: LLL LUL 2+ 3+
OD_BLEPHARITIS: RLL RUL 2+ 3+

## 2024-10-28 ASSESSMENT — REFRACTION_AUTOREFRACTION
OS_CYLINDER: -0.50
OD_AXIS: 120
OD_CYLINDER: -1.00
OD_SPHERE: +0.75
OS_AXIS: 095
OS_SPHERE: +1.50

## 2024-10-28 ASSESSMENT — TONOMETRY: OS_IOP_MMHG: 17

## 2024-10-28 ASSESSMENT — CONFRONTATIONAL VISUAL FIELD TEST (CVF)
OD_FINDINGS: FULL
OS_FINDINGS: FULL

## 2024-10-28 ASSESSMENT — VISUAL ACUITY
OS_BCVA: 20/25-
OD_BCVA: 20/40-

## 2024-10-28 ASSESSMENT — SUPERFICIAL PUNCTATE KERATITIS (SPK)
OD_SPK: 2+
OS_SPK: 3+

## 2024-12-12 ENCOUNTER — OFFICE (OUTPATIENT)
Dept: URBAN - METROPOLITAN AREA CLINIC 94 | Facility: CLINIC | Age: 74
Setting detail: OPHTHALMOLOGY
End: 2024-12-12
Payer: MEDICARE

## 2024-12-12 DIAGNOSIS — H35.3112: ICD-10-CM

## 2024-12-12 DIAGNOSIS — H35.373: ICD-10-CM

## 2024-12-12 DIAGNOSIS — H43.813: ICD-10-CM

## 2024-12-12 DIAGNOSIS — E11.3313: ICD-10-CM

## 2024-12-12 DIAGNOSIS — E11.3312: ICD-10-CM

## 2024-12-12 PROCEDURE — 99024 POSTOP FOLLOW-UP VISIT: CPT | Performed by: SPECIALIST

## 2024-12-12 PROCEDURE — 92134 CPTRZ OPH DX IMG PST SGM RTA: CPT | Performed by: SPECIALIST

## 2024-12-12 PROCEDURE — 92235 FLUORESCEIN ANGRPH MLTIFRAME: CPT | Performed by: SPECIALIST

## 2024-12-12 PROCEDURE — 67028 INJECTION EYE DRUG: CPT | Mod: 79,LT | Performed by: SPECIALIST

## 2024-12-12 ASSESSMENT — REFRACTION_AUTOREFRACTION
OS_SPHERE: +1.50
OS_AXIS: 095
OD_CYLINDER: -1.00
OD_AXIS: 120
OS_CYLINDER: -0.50
OD_SPHERE: +0.75

## 2024-12-12 ASSESSMENT — TONOMETRY
OD_IOP_MMHG: 18
OS_IOP_MMHG: 15

## 2024-12-12 ASSESSMENT — LID EXAM ASSESSMENTS
OD_BLEPHARITIS: RLL RUL 2+ 3+
OS_BLEPHARITIS: LLL LUL 2+ 3+

## 2024-12-12 ASSESSMENT — KERATOMETRY
OD_K1POWER_DIOPTERS: 43.95
METHOD_AUTO_MANUAL: AUTO
OS_AXISANGLE_DEGREES: 145
OD_AXISANGLE_DEGREES: 075
OS_K1POWER_DIOPTERS: 43.85
OS_K2POWER_DIOPTERS: 45.05
OD_K2POWER_DIOPTERS: 44.45

## 2024-12-12 ASSESSMENT — SUPERFICIAL PUNCTATE KERATITIS (SPK)
OD_SPK: 2+
OS_SPK: 3+

## 2024-12-12 ASSESSMENT — VISUAL ACUITY
OS_BCVA: 20/30
OD_BCVA: 20/50

## 2025-01-09 ENCOUNTER — ASC (OUTPATIENT)
Dept: URBAN - METROPOLITAN AREA SURGERY 8 | Facility: SURGERY | Age: 75
Setting detail: OPHTHALMOLOGY
End: 2025-01-09
Payer: MEDICARE

## 2025-01-09 ENCOUNTER — OFFICE (OUTPATIENT)
Dept: URBAN - METROPOLITAN AREA CLINIC 94 | Facility: CLINIC | Age: 75
Setting detail: OPHTHALMOLOGY
End: 2025-01-09
Payer: MEDICARE

## 2025-01-09 DIAGNOSIS — H35.3221: ICD-10-CM

## 2025-01-09 DIAGNOSIS — E11.3312: ICD-10-CM

## 2025-01-09 DIAGNOSIS — H35.373: ICD-10-CM

## 2025-01-09 DIAGNOSIS — H35.3112: ICD-10-CM

## 2025-01-09 DIAGNOSIS — E11.3313: ICD-10-CM

## 2025-01-09 PROCEDURE — 67210 TREATMENT OF RETINAL LESION: CPT | Mod: LT | Performed by: SPECIALIST

## 2025-01-09 PROCEDURE — 92012 INTRM OPH EXAM EST PATIENT: CPT | Mod: 57 | Performed by: SPECIALIST

## 2025-01-09 PROCEDURE — 92134 CPTRZ OPH DX IMG PST SGM RTA: CPT | Performed by: SPECIALIST

## 2025-01-09 ASSESSMENT — REFRACTION_AUTOREFRACTION
OD_CYLINDER: -1.00
OS_SPHERE: +1.50
OD_AXIS: 120
OD_SPHERE: +0.75
OS_CYLINDER: -0.50
OS_AXIS: 095

## 2025-01-09 ASSESSMENT — VISUAL ACUITY
OS_BCVA: 20/30
OD_BCVA: 20/50-1

## 2025-01-09 ASSESSMENT — KERATOMETRY
OS_AXISANGLE_DEGREES: 145
OD_K1POWER_DIOPTERS: 43.95
OS_K1POWER_DIOPTERS: 43.85
OD_K2POWER_DIOPTERS: 44.45
OD_AXISANGLE_DEGREES: 075
METHOD_AUTO_MANUAL: AUTO
OS_K2POWER_DIOPTERS: 45.05

## 2025-01-09 ASSESSMENT — LID EXAM ASSESSMENTS
OD_BLEPHARITIS: RLL RUL 2+ 3+
OS_BLEPHARITIS: LLL LUL 2+ 3+

## 2025-01-09 ASSESSMENT — SUPERFICIAL PUNCTATE KERATITIS (SPK)
OS_SPK: 3+
OD_SPK: 2+

## 2025-01-09 ASSESSMENT — TONOMETRY
OD_IOP_MMHG: 19
OS_IOP_MMHG: 16

## 2025-02-27 ENCOUNTER — ASC (OUTPATIENT)
Dept: URBAN - METROPOLITAN AREA SURGERY 8 | Facility: SURGERY | Age: 75
Setting detail: OPHTHALMOLOGY
End: 2025-02-27
Payer: MEDICARE

## 2025-02-27 DIAGNOSIS — E11.3311: ICD-10-CM

## 2025-02-27 PROCEDURE — 67210 TREATMENT OF RETINAL LESION: CPT | Mod: 79,RT | Performed by: SPECIALIST

## 2025-02-27 ASSESSMENT — CONFRONTATIONAL VISUAL FIELD TEST (CVF)
OS_FINDINGS: FULL
OD_FINDINGS: FULL

## 2025-02-27 ASSESSMENT — KERATOMETRY
OD_AXISANGLE_DEGREES: 075
OD_K1POWER_DIOPTERS: 43.95
OS_K1POWER_DIOPTERS: 43.85
METHOD_AUTO_MANUAL: AUTO
OS_K2POWER_DIOPTERS: 45.05
OS_AXISANGLE_DEGREES: 145
OD_K2POWER_DIOPTERS: 44.45

## 2025-02-27 ASSESSMENT — TONOMETRY
OD_IOP_MMHG: 21
OS_IOP_MMHG: 20

## 2025-02-27 ASSESSMENT — REFRACTION_AUTOREFRACTION
OD_CYLINDER: -1.00
OS_AXIS: 095
OD_SPHERE: +0.75
OS_CYLINDER: -0.50
OD_AXIS: 120
OS_SPHERE: +1.50

## 2025-02-27 ASSESSMENT — LID EXAM ASSESSMENTS
OD_BLEPHARITIS: RLL RUL 2+ 3+
OS_BLEPHARITIS: LLL LUL 2+ 3+

## 2025-02-27 ASSESSMENT — SUPERFICIAL PUNCTATE KERATITIS (SPK)
OD_SPK: 2+
OS_SPK: 3+

## 2025-02-27 ASSESSMENT — VISUAL ACUITY
OS_BCVA: 20/40
OD_BCVA: 20/60

## 2025-03-13 ENCOUNTER — OFFICE (OUTPATIENT)
Dept: URBAN - METROPOLITAN AREA CLINIC 94 | Facility: CLINIC | Age: 75
Setting detail: OPHTHALMOLOGY
End: 2025-03-13
Payer: MEDICARE

## 2025-03-13 DIAGNOSIS — H35.3221: ICD-10-CM

## 2025-03-13 DIAGNOSIS — E11.3311: ICD-10-CM

## 2025-03-13 DIAGNOSIS — E11.3313: ICD-10-CM

## 2025-03-13 DIAGNOSIS — E11.3312: ICD-10-CM

## 2025-03-13 DIAGNOSIS — H35.3112: ICD-10-CM

## 2025-03-13 PROCEDURE — 92134 CPTRZ OPH DX IMG PST SGM RTA: CPT | Performed by: SPECIALIST

## 2025-03-13 PROCEDURE — 99024 POSTOP FOLLOW-UP VISIT: CPT | Performed by: SPECIALIST

## 2025-03-13 PROCEDURE — 67028 INJECTION EYE DRUG: CPT | Mod: 58,LT | Performed by: SPECIALIST

## 2025-03-13 ASSESSMENT — TONOMETRY
OS_IOP_MMHG: 16
OD_IOP_MMHG: 19

## 2025-03-13 ASSESSMENT — SUPERFICIAL PUNCTATE KERATITIS (SPK)
OD_SPK: 2+
OS_SPK: 3+

## 2025-03-13 ASSESSMENT — CONFRONTATIONAL VISUAL FIELD TEST (CVF)
OS_FINDINGS: FULL
OD_FINDINGS: FULL

## 2025-03-13 ASSESSMENT — REFRACTION_AUTOREFRACTION
OS_SPHERE: +1.50
OD_CYLINDER: -1.00
OD_SPHERE: +0.75
OD_AXIS: 120
OS_AXIS: 095
OS_CYLINDER: -0.50

## 2025-03-13 ASSESSMENT — KERATOMETRY
OD_AXISANGLE_DEGREES: 075
OD_K1POWER_DIOPTERS: 43.95
OS_K2POWER_DIOPTERS: 45.05
METHOD_AUTO_MANUAL: AUTO
OS_K1POWER_DIOPTERS: 43.85
OS_AXISANGLE_DEGREES: 145
OD_K2POWER_DIOPTERS: 44.45

## 2025-03-13 ASSESSMENT — LID EXAM ASSESSMENTS
OS_BLEPHARITIS: LLL LUL 2+ 3+
OD_BLEPHARITIS: RLL RUL 2+ 3+

## 2025-03-13 ASSESSMENT — VISUAL ACUITY
OS_BCVA: 20/30-1
OD_BCVA: 20/40-1

## 2025-04-03 ENCOUNTER — OFFICE (OUTPATIENT)
Dept: URBAN - METROPOLITAN AREA CLINIC 94 | Facility: CLINIC | Age: 75
Setting detail: OPHTHALMOLOGY
End: 2025-04-03
Payer: MEDICARE

## 2025-04-03 DIAGNOSIS — H35.3112: ICD-10-CM

## 2025-04-03 DIAGNOSIS — E11.3313: ICD-10-CM

## 2025-04-03 DIAGNOSIS — H35.3221: ICD-10-CM

## 2025-04-03 PROCEDURE — 92235 FLUORESCEIN ANGRPH MLTIFRAME: CPT | Performed by: SPECIALIST

## 2025-04-03 PROCEDURE — 92134 CPTRZ OPH DX IMG PST SGM RTA: CPT | Performed by: SPECIALIST

## 2025-04-03 PROCEDURE — 99024 POSTOP FOLLOW-UP VISIT: CPT | Performed by: SPECIALIST

## 2025-04-03 ASSESSMENT — LID EXAM ASSESSMENTS
OD_BLEPHARITIS: RLL RUL 2+ 3+
OS_BLEPHARITIS: LLL LUL 2+ 3+

## 2025-04-03 ASSESSMENT — REFRACTION_AUTOREFRACTION
OS_CYLINDER: -0.50
OS_AXIS: 095
OD_CYLINDER: -1.00
OS_SPHERE: +1.50
OD_SPHERE: +0.75
OD_AXIS: 120

## 2025-04-03 ASSESSMENT — KERATOMETRY
OS_K2POWER_DIOPTERS: 45.05
OD_K2POWER_DIOPTERS: 44.45
OD_AXISANGLE_DEGREES: 075
OS_AXISANGLE_DEGREES: 145
METHOD_AUTO_MANUAL: AUTO
OS_K1POWER_DIOPTERS: 43.85
OD_K1POWER_DIOPTERS: 43.95

## 2025-04-03 ASSESSMENT — SUPERFICIAL PUNCTATE KERATITIS (SPK)
OD_SPK: 2+
OS_SPK: 3+

## 2025-04-03 ASSESSMENT — CONFRONTATIONAL VISUAL FIELD TEST (CVF)
OS_FINDINGS: FULL
OD_FINDINGS: FULL

## 2025-04-03 ASSESSMENT — VISUAL ACUITY
OS_BCVA: 20/30
OD_BCVA: 20/40-1

## 2025-04-03 ASSESSMENT — TONOMETRY
OD_IOP_MMHG: 19
OS_IOP_MMHG: 16

## 2025-06-02 ENCOUNTER — RX ONLY (RX ONLY)
Age: 75
End: 2025-06-02

## 2025-06-02 ENCOUNTER — OFFICE (OUTPATIENT)
Dept: URBAN - METROPOLITAN AREA CLINIC 94 | Facility: CLINIC | Age: 75
Setting detail: OPHTHALMOLOGY
End: 2025-06-02
Payer: MEDICARE

## 2025-06-02 DIAGNOSIS — H35.3112: ICD-10-CM

## 2025-06-02 DIAGNOSIS — H35.3221: ICD-10-CM

## 2025-06-02 DIAGNOSIS — E11.3312: ICD-10-CM

## 2025-06-02 DIAGNOSIS — E11.3311: ICD-10-CM

## 2025-06-02 DIAGNOSIS — Z79.4: ICD-10-CM

## 2025-06-02 DIAGNOSIS — H43.813: ICD-10-CM

## 2025-06-02 PROCEDURE — 92134 CPTRZ OPH DX IMG PST SGM RTA: CPT | Performed by: OPHTHALMOLOGY

## 2025-06-02 PROCEDURE — 92012 INTRM OPH EXAM EST PATIENT: CPT | Mod: 57 | Performed by: OPHTHALMOLOGY

## 2025-06-02 PROCEDURE — 67210 TREATMENT OF RETINAL LESION: CPT | Mod: LT | Performed by: OPHTHALMOLOGY

## 2025-06-02 ASSESSMENT — REFRACTION_AUTOREFRACTION
OS_AXIS: 095
OD_CYLINDER: -1.00
OD_AXIS: 120
OS_SPHERE: +1.50
OD_SPHERE: +0.75
OS_CYLINDER: -0.50

## 2025-06-02 ASSESSMENT — LID EXAM ASSESSMENTS
OS_BLEPHARITIS: LLL LUL 2+ 3+
OD_BLEPHARITIS: RLL RUL 2+ 3+

## 2025-06-02 ASSESSMENT — SUPERFICIAL PUNCTATE KERATITIS (SPK)
OD_SPK: 2+
OS_SPK: 3+

## 2025-06-02 ASSESSMENT — CONFRONTATIONAL VISUAL FIELD TEST (CVF)
OD_FINDINGS: FULL
OS_FINDINGS: FULL

## 2025-06-02 ASSESSMENT — VISUAL ACUITY
OS_BCVA: 20/25
OD_BCVA: 20/30

## 2025-06-02 ASSESSMENT — TONOMETRY: OD_IOP_MMHG: 19

## 2025-06-02 ASSESSMENT — KERATOMETRY
OD_K1POWER_DIOPTERS: 43.95
OD_K2POWER_DIOPTERS: 44.45
OS_K2POWER_DIOPTERS: 45.05
METHOD_AUTO_MANUAL: AUTO
OD_AXISANGLE_DEGREES: 075
OS_AXISANGLE_DEGREES: 145
OS_K1POWER_DIOPTERS: 43.85

## 2025-07-14 ENCOUNTER — OFFICE (OUTPATIENT)
Dept: URBAN - METROPOLITAN AREA CLINIC 94 | Facility: CLINIC | Age: 75
Setting detail: OPHTHALMOLOGY
End: 2025-07-14
Payer: MEDICARE

## 2025-07-14 DIAGNOSIS — Z79.4: ICD-10-CM

## 2025-07-14 DIAGNOSIS — H43.813: ICD-10-CM

## 2025-07-14 DIAGNOSIS — H35.3221: ICD-10-CM

## 2025-07-14 DIAGNOSIS — E11.3311: ICD-10-CM

## 2025-07-14 DIAGNOSIS — H35.373: ICD-10-CM

## 2025-07-14 DIAGNOSIS — E11.3312: ICD-10-CM

## 2025-07-14 DIAGNOSIS — H35.3112: ICD-10-CM

## 2025-07-14 PROCEDURE — 99024 POSTOP FOLLOW-UP VISIT: CPT | Performed by: SPECIALIST

## 2025-07-14 PROCEDURE — 92134 CPTRZ OPH DX IMG PST SGM RTA: CPT | Performed by: SPECIALIST

## 2025-07-14 PROCEDURE — 67028 INJECTION EYE DRUG: CPT | Mod: 58,LT | Performed by: SPECIALIST

## 2025-07-14 ASSESSMENT — REFRACTION_AUTOREFRACTION
OS_CYLINDER: -0.50
OS_SPHERE: +1.50
OS_AXIS: 095
OD_AXIS: 120
OD_CYLINDER: -1.00
OD_SPHERE: +0.75

## 2025-07-14 ASSESSMENT — VISUAL ACUITY
OS_BCVA: 20/30-
OD_BCVA: 20/40-

## 2025-07-14 ASSESSMENT — SUPERFICIAL PUNCTATE KERATITIS (SPK)
OD_SPK: 2+
OS_SPK: 3+

## 2025-07-14 ASSESSMENT — CONFRONTATIONAL VISUAL FIELD TEST (CVF)
OD_FINDINGS: FULL
OS_FINDINGS: FULL

## 2025-07-14 ASSESSMENT — LID EXAM ASSESSMENTS
OD_BLEPHARITIS: RLL RUL 2+ 3+
OS_BLEPHARITIS: LLL LUL 2+ 3+

## 2025-07-14 ASSESSMENT — TONOMETRY
OD_IOP_MMHG: 14
OS_IOP_MMHG: 17

## 2025-08-04 ENCOUNTER — ASC (OUTPATIENT)
Dept: URBAN - METROPOLITAN AREA SURGERY 8 | Facility: SURGERY | Age: 75
Setting detail: OPHTHALMOLOGY
End: 2025-08-04
Payer: MEDICARE

## 2025-08-04 ENCOUNTER — OFFICE (OUTPATIENT)
Dept: URBAN - METROPOLITAN AREA CLINIC 94 | Facility: CLINIC | Age: 75
Setting detail: OPHTHALMOLOGY
End: 2025-08-04
Payer: MEDICARE

## 2025-08-04 DIAGNOSIS — H35.3112: ICD-10-CM

## 2025-08-04 DIAGNOSIS — E11.3311: ICD-10-CM

## 2025-08-04 DIAGNOSIS — E11.3312: ICD-10-CM

## 2025-08-04 DIAGNOSIS — Z79.4: ICD-10-CM

## 2025-08-04 DIAGNOSIS — H35.3221: ICD-10-CM

## 2025-08-04 DIAGNOSIS — H35.373: ICD-10-CM

## 2025-08-04 DIAGNOSIS — H43.813: ICD-10-CM

## 2025-08-04 PROCEDURE — 67210 TREATMENT OF RETINAL LESION: CPT | Mod: 79,RT | Performed by: SPECIALIST

## 2025-08-04 PROCEDURE — 99024 POSTOP FOLLOW-UP VISIT: CPT | Performed by: SPECIALIST

## 2025-08-04 PROCEDURE — 92134 CPTRZ OPH DX IMG PST SGM RTA: CPT | Performed by: SPECIALIST

## 2025-08-04 ASSESSMENT — REFRACTION_AUTOREFRACTION
OD_SPHERE: +0.75
OS_AXIS: 095
OS_CYLINDER: -0.50
OD_AXIS: 120
OD_CYLINDER: -1.00
OS_SPHERE: +1.50

## 2025-08-04 ASSESSMENT — KERATOMETRY
OS_K1POWER_DIOPTERS: 43.85
OS_K2POWER_DIOPTERS: 45.05
OD_K2POWER_DIOPTERS: 44.45
OD_K1POWER_DIOPTERS: 43.95
METHOD_AUTO_MANUAL: AUTO
OS_AXISANGLE_DEGREES: 145
OD_AXISANGLE_DEGREES: 075

## 2025-08-04 ASSESSMENT — SUPERFICIAL PUNCTATE KERATITIS (SPK)
OD_SPK: 2+
OS_SPK: 3+

## 2025-08-04 ASSESSMENT — LID EXAM ASSESSMENTS
OD_BLEPHARITIS: RLL RUL 2+ 3+
OS_BLEPHARITIS: LLL LUL 2+ 3+

## 2025-08-04 ASSESSMENT — VISUAL ACUITY
OS_BCVA: 20/25-
OD_BCVA: 20/60

## 2025-08-04 ASSESSMENT — TONOMETRY
OS_IOP_MMHG: 15
OD_IOP_MMHG: 20

## 2025-08-04 ASSESSMENT — CONFRONTATIONAL VISUAL FIELD TEST (CVF)
OS_FINDINGS: FULL
OD_FINDINGS: FULL

## 2025-08-13 ENCOUNTER — OFFICE (OUTPATIENT)
Dept: URBAN - METROPOLITAN AREA CLINIC 114 | Facility: CLINIC | Age: 75
Setting detail: OPHTHALMOLOGY
End: 2025-08-13
Payer: MEDICARE

## 2025-08-13 DIAGNOSIS — H43.813: ICD-10-CM

## 2025-08-13 DIAGNOSIS — Z79.4: ICD-10-CM

## 2025-08-13 DIAGNOSIS — H35.3112: ICD-10-CM

## 2025-08-13 DIAGNOSIS — H35.3221: ICD-10-CM

## 2025-08-13 DIAGNOSIS — E11.3312: ICD-10-CM

## 2025-08-13 DIAGNOSIS — H01.004: ICD-10-CM

## 2025-08-13 DIAGNOSIS — H01.001: ICD-10-CM

## 2025-08-13 DIAGNOSIS — H01.002: ICD-10-CM

## 2025-08-13 DIAGNOSIS — H01.005: ICD-10-CM

## 2025-08-13 DIAGNOSIS — H35.373: ICD-10-CM

## 2025-08-13 DIAGNOSIS — H16.223: ICD-10-CM

## 2025-08-13 DIAGNOSIS — E11.3311: ICD-10-CM

## 2025-08-13 PROCEDURE — 99213 OFFICE O/P EST LOW 20 MIN: CPT | Mod: 24 | Performed by: SPECIALIST

## 2025-08-13 ASSESSMENT — VISUAL ACUITY
OD_BCVA: 20/70
OS_BCVA: 20/25

## 2025-08-13 ASSESSMENT — TONOMETRY
OD_IOP_MMHG: 16
OS_IOP_MMHG: 16

## 2025-08-13 ASSESSMENT — CONFRONTATIONAL VISUAL FIELD TEST (CVF)
OD_FINDINGS: FULL
OS_FINDINGS: FULL

## 2025-08-13 ASSESSMENT — TEAR BREAK UP TIME (TBUT)
OS_TBUT: 1+
OD_TBUT: 1+

## 2025-08-13 ASSESSMENT — LID EXAM ASSESSMENTS
OD_BLEPHARITIS: RLL RUL 2+ 3+
OS_BLEPHARITIS: LLL LUL 2+ 3+